# Patient Record
Sex: MALE | Race: BLACK OR AFRICAN AMERICAN | NOT HISPANIC OR LATINO | URBAN - METROPOLITAN AREA
[De-identification: names, ages, dates, MRNs, and addresses within clinical notes are randomized per-mention and may not be internally consistent; named-entity substitution may affect disease eponyms.]

---

## 2018-06-13 ENCOUNTER — OUTPATIENT (OUTPATIENT)
Dept: OUTPATIENT SERVICES | Facility: HOSPITAL | Age: 53
LOS: 1 days | Discharge: HOME | End: 2018-06-13

## 2018-06-13 DIAGNOSIS — E55.9 VITAMIN D DEFICIENCY, UNSPECIFIED: ICD-10-CM

## 2018-06-14 PROBLEM — Z00.00 ENCOUNTER FOR PREVENTIVE HEALTH EXAMINATION: Status: ACTIVE | Noted: 2018-06-14

## 2018-06-19 ENCOUNTER — OUTPATIENT (OUTPATIENT)
Dept: OUTPATIENT SERVICES | Facility: HOSPITAL | Age: 53
LOS: 1 days | Discharge: HOME | End: 2018-06-19

## 2018-06-19 DIAGNOSIS — N39.0 URINARY TRACT INFECTION, SITE NOT SPECIFIED: ICD-10-CM

## 2018-06-21 ENCOUNTER — APPOINTMENT (OUTPATIENT)
Dept: OTOLARYNGOLOGY | Facility: CLINIC | Age: 53
End: 2018-06-21
Payer: MEDICARE

## 2018-06-21 DIAGNOSIS — Z78.9 OTHER SPECIFIED HEALTH STATUS: ICD-10-CM

## 2018-06-21 DIAGNOSIS — Z93.1 GASTROSTOMY STATUS: ICD-10-CM

## 2018-06-21 DIAGNOSIS — J38.6 STENOSIS OF LARYNX: ICD-10-CM

## 2018-06-21 DIAGNOSIS — Z43.0 ENCOUNTER FOR ATTENTION TO TRACHEOSTOMY: ICD-10-CM

## 2018-06-21 DIAGNOSIS — E78.5 HYPERLIPIDEMIA, UNSPECIFIED: ICD-10-CM

## 2018-06-21 PROCEDURE — 31502 CHANGE OF WINDPIPE AIRWAY: CPT

## 2018-06-21 PROCEDURE — 99203 OFFICE O/P NEW LOW 30 MIN: CPT | Mod: 25

## 2018-06-21 PROCEDURE — 31615 TRCHEOBRNCHSC EST TRACHS INC: CPT

## 2018-06-21 PROCEDURE — 31575 DIAGNOSTIC LARYNGOSCOPY: CPT | Mod: 59

## 2018-06-21 RX ORDER — ELECTROLYTES/DEXTROSE
SOLUTION, ORAL ORAL
Refills: 0 | Status: ACTIVE | COMMUNITY

## 2018-06-21 RX ORDER — DOCUSATE SODIUM 50 MG/5 ML
LIQUID (ML) ORAL
Refills: 0 | Status: ACTIVE | COMMUNITY

## 2018-06-21 RX ORDER — ROSUVASTATIN CALCIUM 5 MG/1
TABLET, FILM COATED ORAL
Refills: 0 | Status: ACTIVE | COMMUNITY

## 2018-06-21 RX ORDER — ASPIRIN 195 MG
SUPPOSITORY, RECTAL RECTAL
Refills: 0 | Status: ACTIVE | COMMUNITY

## 2018-06-21 RX ORDER — CHOLECALCIFEROL (VITAMIN D3) 1MM UNIT/G
LIQUID (GRAM) MISCELLANEOUS
Refills: 0 | Status: ACTIVE | COMMUNITY

## 2018-06-21 RX ORDER — MIRTAZAPINE 30 MG/1
TABLET, FILM COATED ORAL
Refills: 0 | Status: ACTIVE | COMMUNITY

## 2018-06-21 RX ORDER — ENOXAPARIN SODIUM 300 MG/3ML
INJECTION INTRAVENOUS; SUBCUTANEOUS
Refills: 0 | Status: ACTIVE | COMMUNITY

## 2018-06-23 ENCOUNTER — INPATIENT (INPATIENT)
Facility: HOSPITAL | Age: 53
LOS: 16 days | Discharge: NURSING, REHAB OR LONG-TERM CARE | End: 2018-07-10
Attending: INTERNAL MEDICINE | Admitting: INTERNAL MEDICINE
Payer: MEDICARE

## 2018-06-23 VITALS — OXYGEN SATURATION: 96 % | HEART RATE: 102 BPM

## 2018-06-23 LAB
ALBUMIN SERPL ELPH-MCNC: 3.5 G/DL — SIGNIFICANT CHANGE UP (ref 3.5–5.2)
ALP SERPL-CCNC: 90 U/L — SIGNIFICANT CHANGE UP (ref 30–115)
ALT FLD-CCNC: 32 U/L — SIGNIFICANT CHANGE UP (ref 0–41)
ANION GAP SERPL CALC-SCNC: 10 MMOL/L — SIGNIFICANT CHANGE UP (ref 7–14)
APAP SERPL-MCNC: <5 UG/ML — LOW (ref 10–30)
APTT BLD: 36.5 SEC — SIGNIFICANT CHANGE UP (ref 27–39.2)
AST SERPL-CCNC: 20 U/L — SIGNIFICANT CHANGE UP (ref 0–41)
BASE EXCESS BLDV CALC-SCNC: 4.6 MMOL/L — HIGH (ref -2–2)
BASOPHILS # BLD AUTO: 0.02 K/UL — SIGNIFICANT CHANGE UP (ref 0–0.2)
BASOPHILS NFR BLD AUTO: 0.2 % — SIGNIFICANT CHANGE UP (ref 0–1)
BILIRUB DIRECT SERPL-MCNC: <0.2 MG/DL — SIGNIFICANT CHANGE UP (ref 0–0.2)
BILIRUB INDIRECT FLD-MCNC: SIGNIFICANT CHANGE UP MG/DL (ref 0.2–1.2)
BILIRUB SERPL-MCNC: <0.2 MG/DL — SIGNIFICANT CHANGE UP (ref 0.2–1.2)
BILIRUB SERPL-MCNC: <0.2 MG/DL — SIGNIFICANT CHANGE UP (ref 0.2–1.2)
BLD GP AB SCN SERPL QL: SIGNIFICANT CHANGE UP
BUN SERPL-MCNC: 9 MG/DL — LOW (ref 10–20)
CA-I SERPL-SCNC: 1.17 MMOL/L — SIGNIFICANT CHANGE UP (ref 1.12–1.3)
CALCIUM SERPL-MCNC: 8.7 MG/DL — SIGNIFICANT CHANGE UP (ref 8.5–10.1)
CHLORIDE SERPL-SCNC: 84 MMOL/L — LOW (ref 98–110)
CK SERPL-CCNC: 113 U/L — SIGNIFICANT CHANGE UP (ref 0–225)
CO2 SERPL-SCNC: 33 MMOL/L — HIGH (ref 17–32)
CREAT SERPL-MCNC: 0.5 MG/DL — LOW (ref 0.7–1.5)
EOSINOPHIL # BLD AUTO: 0 K/UL — SIGNIFICANT CHANGE UP (ref 0–0.7)
EOSINOPHIL NFR BLD AUTO: 0 % — SIGNIFICANT CHANGE UP (ref 0–8)
ETHANOL SERPL-MCNC: <10 MG/DL — HIGH
GAS PNL BLDA: SIGNIFICANT CHANGE UP
GAS PNL BLDV: 129 MMOL/L — LOW (ref 136–145)
GAS PNL BLDV: SIGNIFICANT CHANGE UP
GLUCOSE SERPL-MCNC: 112 MG/DL — HIGH (ref 70–99)
HCO3 BLDV-SCNC: 36 MMOL/L — HIGH (ref 22–29)
HCT VFR BLD CALC: 39.2 % — LOW (ref 42–52)
HCT VFR BLDA CALC: 40.3 % — SIGNIFICANT CHANGE UP (ref 34–44)
HGB BLD CALC-MCNC: 13.1 G/DL — LOW (ref 14–18)
HGB BLD-MCNC: 12.4 G/DL — LOW (ref 14–18)
IMM GRANULOCYTES NFR BLD AUTO: 0.6 % — HIGH (ref 0.1–0.3)
INR BLD: 1.24 RATIO — SIGNIFICANT CHANGE UP (ref 0.65–1.3)
LACTATE BLDV-MCNC: 3.4 MMOL/L — HIGH (ref 0.5–1.6)
LACTATE SERPL-SCNC: 3.6 MMOL/L — HIGH (ref 0.5–2.2)
LIDOCAIN IGE QN: 10 U/L — SIGNIFICANT CHANGE UP (ref 7–60)
LYMPHOCYTES # BLD AUTO: 1.21 K/UL — SIGNIFICANT CHANGE UP (ref 1.2–3.4)
LYMPHOCYTES # BLD AUTO: 9.7 % — LOW (ref 20.5–51.1)
MAGNESIUM SERPL-MCNC: 2 MG/DL — SIGNIFICANT CHANGE UP (ref 1.8–2.4)
MCHC RBC-ENTMCNC: 29 PG — SIGNIFICANT CHANGE UP (ref 27–31)
MCHC RBC-ENTMCNC: 31.6 G/DL — LOW (ref 32–37)
MCV RBC AUTO: 91.8 FL — SIGNIFICANT CHANGE UP (ref 80–94)
MONOCYTES # BLD AUTO: 0.92 K/UL — HIGH (ref 0.1–0.6)
MONOCYTES NFR BLD AUTO: 7.4 % — SIGNIFICANT CHANGE UP (ref 1.7–9.3)
NEUTROPHILS # BLD AUTO: 10.25 K/UL — HIGH (ref 1.4–6.5)
NEUTROPHILS NFR BLD AUTO: 82.1 % — HIGH (ref 42.2–75.2)
NRBC # BLD: 0 /100 WBCS — SIGNIFICANT CHANGE UP (ref 0–0)
PCO2 BLDV: 98 MMHG — HIGH (ref 41–51)
PH BLDV: 7.18 — LOW (ref 7.26–7.43)
PLATELET # BLD AUTO: 326 K/UL — SIGNIFICANT CHANGE UP (ref 130–400)
PO2 BLDV: 50 MMHG — HIGH (ref 20–40)
POTASSIUM BLDV-SCNC: 4.4 MMOL/L — SIGNIFICANT CHANGE UP (ref 3.3–5.6)
POTASSIUM SERPL-MCNC: 5 MMOL/L — SIGNIFICANT CHANGE UP (ref 3.5–5)
POTASSIUM SERPL-SCNC: 5 MMOL/L — SIGNIFICANT CHANGE UP (ref 3.5–5)
PROT SERPL-MCNC: 6.2 G/DL — SIGNIFICANT CHANGE UP (ref 6–8)
PROTHROM AB SERPL-ACNC: 13.4 SEC — HIGH (ref 9.95–12.87)
RBC # BLD: 4.27 M/UL — LOW (ref 4.7–6.1)
RBC # FLD: 11.7 % — SIGNIFICANT CHANGE UP (ref 11.5–14.5)
SALICYLATES SERPL-MCNC: <0.3 MG/DL — LOW (ref 4–30)
SAO2 % BLDV: 81 % — SIGNIFICANT CHANGE UP
SODIUM SERPL-SCNC: 127 MMOL/L — LOW (ref 135–146)
TROPONIN T SERPL-MCNC: 0.03 NG/ML — CRITICAL HIGH
TYPE + AB SCN PNL BLD: SIGNIFICANT CHANGE UP
WBC # BLD: 12.48 K/UL — HIGH (ref 4.8–10.8)
WBC # FLD AUTO: 12.48 K/UL — HIGH (ref 4.8–10.8)

## 2018-06-23 RX ORDER — SODIUM CHLORIDE 9 MG/ML
2000 INJECTION INTRAMUSCULAR; INTRAVENOUS; SUBCUTANEOUS ONCE
Qty: 0 | Refills: 0 | Status: COMPLETED | OUTPATIENT
Start: 2018-06-23 | End: 2018-06-23

## 2018-06-23 RX ORDER — VANCOMYCIN HCL 1 G
1000 VIAL (EA) INTRAVENOUS EVERY 12 HOURS
Qty: 0 | Refills: 0 | Status: DISCONTINUED | OUTPATIENT
Start: 2018-06-23 | End: 2018-07-02

## 2018-06-23 RX ORDER — NOREPINEPHRINE BITARTRATE/D5W 8 MG/250ML
0.05 PLASTIC BAG, INJECTION (ML) INTRAVENOUS
Qty: 8 | Refills: 0 | Status: DISCONTINUED | OUTPATIENT
Start: 2018-06-23 | End: 2018-06-23

## 2018-06-23 RX ORDER — ACETAMINOPHEN 500 MG
0 TABLET ORAL
Qty: 0 | Refills: 0 | COMMUNITY

## 2018-06-23 RX ORDER — MIRTAZAPINE 45 MG/1
7.5 TABLET, ORALLY DISINTEGRATING ORAL
Qty: 0 | Refills: 0 | COMMUNITY

## 2018-06-23 RX ORDER — DOCUSATE SODIUM 100 MG
0 CAPSULE ORAL
Qty: 0 | Refills: 0 | COMMUNITY

## 2018-06-23 RX ORDER — CEFEPIME 1 G/1
1000 INJECTION, POWDER, FOR SOLUTION INTRAMUSCULAR; INTRAVENOUS EVERY 8 HOURS
Qty: 0 | Refills: 0 | Status: DISCONTINUED | OUTPATIENT
Start: 2018-06-23 | End: 2018-06-24

## 2018-06-23 RX ORDER — CEFEPIME 1 G/1
INJECTION, POWDER, FOR SOLUTION INTRAMUSCULAR; INTRAVENOUS
Qty: 0 | Refills: 0 | Status: DISCONTINUED | OUTPATIENT
Start: 2018-06-23 | End: 2018-06-24

## 2018-06-23 RX ORDER — NOREPINEPHRINE BITARTRATE/D5W 8 MG/250ML
0.12 PLASTIC BAG, INJECTION (ML) INTRAVENOUS
Qty: 8 | Refills: 0 | Status: DISCONTINUED | OUTPATIENT
Start: 2018-06-23 | End: 2018-06-25

## 2018-06-23 RX ORDER — CEFEPIME 1 G/1
1000 INJECTION, POWDER, FOR SOLUTION INTRAMUSCULAR; INTRAVENOUS ONCE
Qty: 0 | Refills: 0 | Status: COMPLETED | OUTPATIENT
Start: 2018-06-23 | End: 2018-06-23

## 2018-06-23 RX ORDER — MIRTAZAPINE 45 MG/1
7.5 TABLET, ORALLY DISINTEGRATING ORAL AT BEDTIME
Qty: 0 | Refills: 0 | Status: DISCONTINUED | OUTPATIENT
Start: 2018-06-23 | End: 2018-07-10

## 2018-06-23 RX ORDER — LANOLIN ALCOHOL/MO/W.PET/CERES
0 CREAM (GRAM) TOPICAL
Qty: 0 | Refills: 0 | COMMUNITY

## 2018-06-23 RX ORDER — VANCOMYCIN HCL 1 G
1000 VIAL (EA) INTRAVENOUS ONCE
Qty: 0 | Refills: 0 | Status: COMPLETED | OUTPATIENT
Start: 2018-06-23 | End: 2018-06-23

## 2018-06-23 RX ORDER — HEPARIN SODIUM 5000 [USP'U]/ML
5000 INJECTION INTRAVENOUS; SUBCUTANEOUS EVERY 8 HOURS
Qty: 0 | Refills: 0 | Status: DISCONTINUED | OUTPATIENT
Start: 2018-06-23 | End: 2018-07-10

## 2018-06-23 RX ADMIN — Medication 25.78 MICROGRAM(S)/KG/MIN: at 19:15

## 2018-06-23 RX ADMIN — HEPARIN SODIUM 5000 UNIT(S): 5000 INJECTION INTRAVENOUS; SUBCUTANEOUS at 22:14

## 2018-06-23 RX ADMIN — Medication 250 MILLIGRAM(S): at 15:57

## 2018-06-23 RX ADMIN — MIRTAZAPINE 7.5 MILLIGRAM(S): 45 TABLET, ORALLY DISINTEGRATING ORAL at 22:28

## 2018-06-23 RX ADMIN — CEFEPIME 100 MILLIGRAM(S): 1 INJECTION, POWDER, FOR SOLUTION INTRAMUSCULAR; INTRAVENOUS at 15:57

## 2018-06-23 RX ADMIN — CEFEPIME 100 MILLIGRAM(S): 1 INJECTION, POWDER, FOR SOLUTION INTRAMUSCULAR; INTRAVENOUS at 22:34

## 2018-06-23 RX ADMIN — SODIUM CHLORIDE 8000 MILLILITER(S): 9 INJECTION INTRAMUSCULAR; INTRAVENOUS; SUBCUTANEOUS at 15:57

## 2018-06-23 NOTE — ED PROVIDER NOTE - PROGRESS NOTE DETAILS
Spoke to Eufemia Gusman, next of kin, aware of patient in the hospital and will come to the hospital Paging icu for approval Spoke to Dr. Meza, ICU fellow, who appoves patient to the vent unit under Dr. Traore Care endorsed to Dr. Mcnair

## 2018-06-23 NOTE — ED PROCEDURE NOTE - NS ED ATTENDING STATEMENT MOD
Attending Only
I have personally seen and examined this patient.  I have fully participated in the care of this patient. I have reviewed all pertinent clinical information, including history, physical exam, plan and the Resident’s note and agree except as noted.
I have personally seen and examined this patient.  I have fully participated in the care of this patient. I have reviewed all pertinent clinical information, including history, physical exam, plan and the Resident’s note and agree except as noted.

## 2018-06-23 NOTE — ED ADULT NURSE NOTE - OBJECTIVE STATEMENT
patient from [facility] BIBA found unresponsive and in respiratory distress by staff. Patient with previous trach and peg. hypotensive and tachycardic upon arrival. IO placed by EMS. trach changed by RT at bedside to cuffed

## 2018-06-23 NOTE — ED PROVIDER NOTE - ATTENDING CONTRIBUTION TO CARE
I am unable to obtain a comprehensive history, review of systems, past medical history, and/or physical exam due to constraints imposed by the urgency of the patient's clinical condition and/or mental status.     Pt is a 54yo male (unknown last time seen well) was found unresponsive and not breathing by NH staff.  EMS called and pt placed on trach mask as O2 sat was in 20s%.  Improved to 50s and EMS started bagging.  Pt awake on arrival but sluggish.    Exam: b/l breath sounds on ventilator, soft scaphoid abdomen, thin emaciated male, no LE edema, no rash, 1+ radial and DP pulses - hypotensive responding to IV push dose Epi  Imp: ?hypoxic injury  Plan: labs, XR chest, trach change, pressors, ICU consult (pt full code)

## 2018-06-23 NOTE — ED PROVIDER NOTE - OBJECTIVE STATEMENT
54 yo M with hx of acute spinal injury, trach, peg, neurogenic bladder, PTSD, BIBA from [place], for evaluation respiratory failure, onset 54 yo M with hx of acute spinal injury, trach, peg, neurogenic bladder, PTSD, BIBA from [place], for evaluation respiratory failure, onset today, patient's trach was aspirated and BVM was used, O2 sat was 88%. No fever, no chills, no headache, no nausea, no vomiting, no chest pain, no back pain. Per EMS, patient was started on BVM, and transfer. No other symptoms.

## 2018-06-23 NOTE — ED PROCEDURE NOTE - CPROC ED TIME OUT STATEMENT1
“Patient's name, , procedure and correct site were confirmed during the [place] Timeout.”
“Patient's name, , procedure and correct site were confirmed during the [place] Timeout.”
“Patient's name, , procedure and correct site were confirmed during the [place] Timeout.”

## 2018-06-23 NOTE — ED PROVIDER NOTE - CARE PLAN
Principal Discharge DX:	Septic shock  Secondary Diagnosis:	Acute respiratory failure with hypoxia and hypercapnia  Secondary Diagnosis:	Pneumonia of right lung due to infectious organism, unspecified part of lung

## 2018-06-23 NOTE — ED PROCEDURE NOTE - CPROC ED ARTER LINE DETAIL1
Using sterile technique, the correct location was identified, and a needle was inserted into the artery (specify in FT)./Connected to a pressurized flush line./Line was sutured in place./Hemostasis was achieved with direct pressure, and a dry sterile dressing applied./Positive blood return was obtained via the catheter.

## 2018-06-23 NOTE — ED PROVIDER NOTE - CRITICAL CARE PROVIDED
interpretation of diagnostic studies/consultation with other physicians/telephone consultation with the patient's family/direct patient care (not related to procedure)/documentation

## 2018-06-23 NOTE — ED PROVIDER NOTE - SECONDARY DIAGNOSIS.
Acute respiratory failure with hypoxia and hypercapnia Pneumonia of right lung due to infectious organism, unspecified part of lung

## 2018-06-23 NOTE — ED PROCEDURE NOTE - GENERAL INDICATIONS
uncuffed switch to cuff for mechanical ventilation due to low O2 sat uncuffed switch to cuffed Shiley for mechanical ventilation due to low O2 sat

## 2018-06-23 NOTE — ED ADULT NURSE NOTE - PMH
Deep vein thrombosis (DVT) of popliteal vein of right lower extremity, unspecified chronicity    Neurogenic bladder    PTSD (post-traumatic stress disorder)    Respiratory failure    Spinal cord injury, cervical region

## 2018-06-24 DIAGNOSIS — Z98.890 OTHER SPECIFIED POSTPROCEDURAL STATES: Chronic | ICD-10-CM

## 2018-06-24 DIAGNOSIS — Z93.1 GASTROSTOMY STATUS: Chronic | ICD-10-CM

## 2018-06-24 LAB
-  COAGULASE NEGATIVE STAPHYLOCOCCUS: SIGNIFICANT CHANGE UP
ANION GAP SERPL CALC-SCNC: 15 MMOL/L — HIGH (ref 7–14)
APPEARANCE UR: (no result)
BILIRUB UR-MCNC: NEGATIVE — SIGNIFICANT CHANGE UP
BUN SERPL-MCNC: 6 MG/DL — LOW (ref 10–20)
CALCIUM SERPL-MCNC: 8.7 MG/DL — SIGNIFICANT CHANGE UP (ref 8.5–10.1)
CHLORIDE SERPL-SCNC: 95 MMOL/L — LOW (ref 98–110)
CK MB CFR SERPL CALC: 2.7 NG/ML — SIGNIFICANT CHANGE UP (ref 0.6–6.3)
CK SERPL-CCNC: 251 U/L — HIGH (ref 0–225)
CO2 SERPL-SCNC: 25 MMOL/L — SIGNIFICANT CHANGE UP (ref 17–32)
COLOR SPEC: YELLOW — SIGNIFICANT CHANGE UP
CREAT SERPL-MCNC: <0.5 MG/DL — LOW (ref 0.7–1.5)
DIFF PNL FLD: (no result)
GLUCOSE SERPL-MCNC: 74 MG/DL — SIGNIFICANT CHANGE UP (ref 70–99)
GLUCOSE UR QL: NEGATIVE MG/DL — SIGNIFICANT CHANGE UP
GRAM STN FLD: SIGNIFICANT CHANGE UP
HCT VFR BLD CALC: 34.7 % — LOW (ref 42–52)
HGB BLD-MCNC: 11.8 G/DL — LOW (ref 14–18)
KETONES UR-MCNC: 15
LEUKOCYTE ESTERASE UR-ACNC: (no result)
MAGNESIUM SERPL-MCNC: 1.9 MG/DL — SIGNIFICANT CHANGE UP (ref 1.8–2.4)
MCHC RBC-ENTMCNC: 29.1 PG — SIGNIFICANT CHANGE UP (ref 27–31)
MCHC RBC-ENTMCNC: 34 G/DL — SIGNIFICANT CHANGE UP (ref 32–37)
MCV RBC AUTO: 85.5 FL — SIGNIFICANT CHANGE UP (ref 80–94)
METHOD TYPE: SIGNIFICANT CHANGE UP
MRSA PCR RESULT.: POSITIVE
NITRITE UR-MCNC: NEGATIVE — SIGNIFICANT CHANGE UP
NRBC # BLD: 0 /100 WBCS — SIGNIFICANT CHANGE UP (ref 0–0)
PH UR: 7.5 — SIGNIFICANT CHANGE UP (ref 5–8)
PLATELET # BLD AUTO: 281 K/UL — SIGNIFICANT CHANGE UP (ref 130–400)
POTASSIUM SERPL-MCNC: 4.4 MMOL/L — SIGNIFICANT CHANGE UP (ref 3.5–5)
POTASSIUM SERPL-SCNC: 4.4 MMOL/L — SIGNIFICANT CHANGE UP (ref 3.5–5)
PROT UR-MCNC: (no result) MG/DL
RBC # BLD: 4.06 M/UL — LOW (ref 4.7–6.1)
RBC # FLD: 11.7 % — SIGNIFICANT CHANGE UP (ref 11.5–14.5)
SODIUM SERPL-SCNC: 135 MMOL/L — SIGNIFICANT CHANGE UP (ref 135–146)
SP GR SPEC: <=1.005 — SIGNIFICANT CHANGE UP (ref 1.01–1.03)
SPECIMEN SOURCE: SIGNIFICANT CHANGE UP
TROPONIN T SERPL-MCNC: 0.08 NG/ML — CRITICAL HIGH
UROBILINOGEN FLD QL: 0.2 MG/DL — SIGNIFICANT CHANGE UP (ref 0.2–0.2)
WBC # BLD: 16.51 K/UL — HIGH (ref 4.8–10.8)
WBC # FLD AUTO: 16.51 K/UL — HIGH (ref 4.8–10.8)

## 2018-06-24 RX ORDER — DOCUSATE SODIUM 100 MG
100 CAPSULE ORAL THREE TIMES A DAY
Qty: 0 | Refills: 0 | Status: DISCONTINUED | OUTPATIENT
Start: 2018-06-24 | End: 2018-06-28

## 2018-06-24 RX ORDER — LANOLIN ALCOHOL/MO/W.PET/CERES
5 CREAM (GRAM) TOPICAL AT BEDTIME
Qty: 0 | Refills: 0 | Status: DISCONTINUED | OUTPATIENT
Start: 2018-06-24 | End: 2018-07-10

## 2018-06-24 RX ORDER — ATORVASTATIN CALCIUM 80 MG/1
80 TABLET, FILM COATED ORAL AT BEDTIME
Qty: 0 | Refills: 0 | Status: DISCONTINUED | OUTPATIENT
Start: 2018-06-24 | End: 2018-07-10

## 2018-06-24 RX ORDER — ACETAMINOPHEN 500 MG
650 TABLET ORAL EVERY 6 HOURS
Qty: 0 | Refills: 0 | Status: DISCONTINUED | OUTPATIENT
Start: 2018-06-24 | End: 2018-07-10

## 2018-06-24 RX ORDER — MEROPENEM 1 G/30ML
INJECTION INTRAVENOUS
Qty: 0 | Refills: 0 | Status: DISCONTINUED | OUTPATIENT
Start: 2018-06-24 | End: 2018-07-02

## 2018-06-24 RX ORDER — SODIUM CHLORIDE 9 MG/ML
1000 INJECTION INTRAMUSCULAR; INTRAVENOUS; SUBCUTANEOUS ONCE
Qty: 0 | Refills: 0 | Status: DISCONTINUED | OUTPATIENT
Start: 2018-06-24 | End: 2018-07-06

## 2018-06-24 RX ORDER — ASPIRIN/CALCIUM CARB/MAGNESIUM 324 MG
81 TABLET ORAL DAILY
Qty: 0 | Refills: 0 | Status: DISCONTINUED | OUTPATIENT
Start: 2018-06-24 | End: 2018-07-06

## 2018-06-24 RX ORDER — MEROPENEM 1 G/30ML
1000 INJECTION INTRAVENOUS ONCE
Qty: 0 | Refills: 0 | Status: COMPLETED | OUTPATIENT
Start: 2018-06-24 | End: 2018-06-24

## 2018-06-24 RX ORDER — MEROPENEM 1 G/30ML
1000 INJECTION INTRAVENOUS EVERY 8 HOURS
Qty: 0 | Refills: 0 | Status: DISCONTINUED | OUTPATIENT
Start: 2018-06-24 | End: 2018-07-02

## 2018-06-24 RX ORDER — PANTOPRAZOLE SODIUM 20 MG/1
40 TABLET, DELAYED RELEASE ORAL
Qty: 0 | Refills: 0 | Status: DISCONTINUED | OUTPATIENT
Start: 2018-06-24 | End: 2018-07-10

## 2018-06-24 RX ORDER — SENNA PLUS 8.6 MG/1
2 TABLET ORAL AT BEDTIME
Qty: 0 | Refills: 0 | Status: DISCONTINUED | OUTPATIENT
Start: 2018-06-24 | End: 2018-06-28

## 2018-06-24 RX ADMIN — Medication 650 MILLIGRAM(S): at 23:38

## 2018-06-24 RX ADMIN — Medication 250 MILLIGRAM(S): at 18:10

## 2018-06-24 RX ADMIN — MEROPENEM 100 MILLIGRAM(S): 1 INJECTION INTRAVENOUS at 23:48

## 2018-06-24 RX ADMIN — HEPARIN SODIUM 5000 UNIT(S): 5000 INJECTION INTRAVENOUS; SUBCUTANEOUS at 05:15

## 2018-06-24 RX ADMIN — MEROPENEM 100 MILLIGRAM(S): 1 INJECTION INTRAVENOUS at 11:59

## 2018-06-24 RX ADMIN — Medication 5 MILLIGRAM(S): at 23:33

## 2018-06-24 RX ADMIN — HEPARIN SODIUM 5000 UNIT(S): 5000 INJECTION INTRAVENOUS; SUBCUTANEOUS at 21:42

## 2018-06-24 RX ADMIN — CEFEPIME 100 MILLIGRAM(S): 1 INJECTION, POWDER, FOR SOLUTION INTRAMUSCULAR; INTRAVENOUS at 05:17

## 2018-06-24 RX ADMIN — Medication 100 MILLIGRAM(S): at 14:07

## 2018-06-24 RX ADMIN — SENNA PLUS 2 TABLET(S): 8.6 TABLET ORAL at 23:33

## 2018-06-24 RX ADMIN — HEPARIN SODIUM 5000 UNIT(S): 5000 INJECTION INTRAVENOUS; SUBCUTANEOUS at 14:07

## 2018-06-24 RX ADMIN — ATORVASTATIN CALCIUM 80 MILLIGRAM(S): 80 TABLET, FILM COATED ORAL at 23:33

## 2018-06-24 RX ADMIN — MIRTAZAPINE 7.5 MILLIGRAM(S): 45 TABLET, ORALLY DISINTEGRATING ORAL at 21:42

## 2018-06-24 RX ADMIN — Medication 650 MILLIGRAM(S): at 08:38

## 2018-06-24 RX ADMIN — Medication 100 MILLIGRAM(S): at 21:41

## 2018-06-24 RX ADMIN — Medication 81 MILLIGRAM(S): at 18:11

## 2018-06-24 RX ADMIN — Medication 250 MILLIGRAM(S): at 05:17

## 2018-06-24 RX ADMIN — PANTOPRAZOLE SODIUM 40 MILLIGRAM(S): 20 TABLET, DELAYED RELEASE ORAL at 12:00

## 2018-06-24 NOTE — H&P ADULT - ASSESSMENT
54 yo M presents with respiratory distress/hypoxemia, placed on mechanical ventilation and hypotension requiring pressor support.      Acute on Chronic Hypoxemic Respiratory Failure secondary to septic shock secondary to HCAP with concurrent mucous plug   -Continue Merrem and Vancomycin  -Check Vancomycin trough  -Check nasal MRSA swab, Deep tracheal aspirate/sputum culture  -Check blood cultures, Urinalaysis and Urine culture   -Continue mechanical ventilation  -BP stable this morning at 135/65, will titrate off Levophed    Troponemia   -r/o ACS  -f/u subsequent cardiac enzyme sets  -EKG negative changes    CODE STATUS: FULL CODE    Disposition: Return to NH when stable 54 yo M presents with respiratory distress/hypoxemia, placed on mechanical ventilation and hypotension requiring pressor support.      Acute on Chronic Hypoxemic Respiratory Failure secondary to septic shock secondary to HCAP with concurrent mucous plug   -Continue Merrem and Vancomycin  -Check Vancomycin trough  -Check nasal MRSA swab, Deep tracheal aspirate/sputum culture  -Check blood cultures, Urinalaysis and Urine culture   -Continue mechanical ventilation  -BP stable this morning at 135/65, will titrate off Levophed    Troponemia   -r/o ACS  -f/u subsequent cardiac enzyme sets  -EKG negative changes    CODE STATUS: FULL CODE    Heparin SQ and protonix prophylaxis   Disposition: Return to NH when stable 54 yo M presents with respiratory distress/hypoxemia, placed on mechanical ventilation and hypotension requiring pressor support.      Acute on Chronic Hypoxemic Respiratory Failure secondary to septic shock secondary to HCAP with concurrent mucous plug   -Continue Merrem and Vancomycin, awaiting Infectious disease evaluation  -Check Vancomycin trough  -Check nasal MRSA swab, Deep tracheal aspirate/sputum culture  -Check blood cultures, Urinalaysis and Urine culture   -Continue mechanical ventilation  -BP stable this morning at 135/65, will titrate off Levophed    Troponemia   -r/o ACS  -f/u subsequent cardiac enzyme sets  -EKG negative changes    CODE STATUS: FULL CODE    Heparin SQ and protonix prophylaxis   Disposition: Return to NH when stable 54 yo M presents with respiratory distress/hypoxemia, placed on mechanical ventilation and hypotension requiring pressor support.      Acute on Chronic Hypoxemic Respiratory Failure secondary to septic shock secondary to HCAP with concurrent mucous plug   -Continue Merrem and Vancomycin, awaiting Infectious disease evaluation  -Check Vancomycin trough  -Check nasal MRSA swab, Deep tracheal aspirate/sputum culture  -Check blood cultures, Urinalaysis and Urine culture   -Continue mechanical ventilation  -BP stable this morning at 135/65, will titrate off Levophed  -Will check venous duplex    Troponemia   -r/o ACS  -f/u subsequent cardiac enzyme sets  -EKG negative changes    CODE STATUS: FULL CODE    Heparin SQ and protonix prophylaxis   Disposition: Return to NH when stable 54 yo M presents with respiratory distress/hypoxemia, placed on mechanical ventilation and hypotension requiring pressor support.      Acute Hypoxemic Respiratory Failure secondary to septic shock secondary to HCAP with concurrent mucous plug(?)   -Continue Merrem and Vancomycin, awaiting Infectious disease evaluation  -Check Vancomycin trough  -Check nasal MRSA swab, Deep tracheal aspirate/sputum culture  -Check blood cultures, Urinalaysis and Urine culture   -Continue mechanical ventilation  -BP stable this morning at 135/65, will titrate off Levophed  -Will check venous duplex    Troponemia   -r/o ACS  -f/u subsequent cardiac enzyme sets  -EKG negative changes    CODE STATUS: FULL CODE    Heparin SQ and protonix prophylaxis   Disposition: Return to NH when stable

## 2018-06-24 NOTE — H&P ADULT - NSHPREVIEWOFSYSTEMS_GEN_ALL_CORE
REVIEW OF SYSTEMS:  CONSTITUTIONAL: No fever, weight loss, or fatigue  EYES: No eye pain, visual disturbances, or discharge  ENMT:  No difficulty hearing, tinnitus, vertigo; No sinus or throat pain  NECK: tracheostomy intact   BREASTS: No pain, masses, or nipple discharge  RESPIRATORY: No cough, wheezing, chills or hemoptysis; positive shortness of breath  CARDIOVASCULAR: No chest pain, palpitations, dizziness, or leg swelling  GASTROINTESTINAL: No abdominal or epigastric pain. No nausea, vomiting, or hematemesis; No diarrhea or constipation. No melena or hematochezia.  GENITOURINARY: No dysuria, frequency, hematuria, or incontinence  NEUROLOGICAL: No headaches, memory loss, loss of strength, numbness, or tremors  SKIN: No itching, burning, rashes, or lesions   LYMPH NODES: No enlarged glands  ENDOCRINE: No heat or cold intolerance; No hair loss  MUSCULOSKELETAL: No joint pain or swelling; No muscle, back, or extremity pain  PSYCHIATRIC: No depression, anxiety, mood swings, or difficulty sleeping  HEME/LYMPH: No easy bruising, or bleeding gums  ALLERGY AND IMMUNOLOGIC: No hives or eczema

## 2018-06-24 NOTE — H&P ADULT - NSHPLABSRESULTS_GEN_ALL_CORE
12.4   12.48 )-----------( 326      ( 23 Jun 2018 15:17 )             39.2       06-23    127<L>  |  84<L>  |  9<L>  ----------------------------<  112<H>  5.0   |  33<H>  |  0.5<L>    Ca    8.7      23 Jun 2018 15:17  Mg     2.0     06-23    TPro  6.2  /  Alb  3.5  /  TBili  <0.2  /  DBili  <0.2  /  AST  20  /  ALT  32  /  AlkPhos  90  06-23          ABG - ( 23 Jun 2018 17:44 )  pH, Arterial: 7.44  pH, Blood: x     /  pCO2: 44    /  pO2: 64    / HCO3: 30    / Base Excess: 5.1   /  SaO2: 95                      PT/INR - ( 23 Jun 2018 15:17 )   PT: 13.40 sec;   INR: 1.24 ratio         PTT - ( 23 Jun 2018 15:17 )  PTT:36.5 sec    Lactate Trend  06-23 @ 15:17 Lactate:3.6       CARDIAC MARKERS ( 23 Jun 2018 15:17 )  x     / 0.03 ng/mL / 113 U/L / x     / x

## 2018-06-24 NOTE — H&P ADULT - HISTORY OF PRESENT ILLNESS
54 yo M with PMHx of acute spinal injury with paraplegia s/p trach and peg (7/27/2017), recurrent pneumonia, ESBL E.Coli Bacteremia,  Klebsiella UTI, neurogenic bladder, PTSD, right popliteal DVT presented from [facility] for episode of desaturation and respiratory failure occurring on morning of presentation. Despite aspiration from trach, patient remained hypoxic, in the ED patient had his cuffless trach substituted for size 6 cuffed shiley. Patient was also found to be hypotensive to 58/36, CXR revealed near opacification of the right lung. Patient was placed on ventilator and started on Levophed.  As per patient, he endorses shortness of breath, denies chest pain, palpitations, fevers, chills, nausea, vomiting. 54 yo M with PMHx of acute spinal injury with quadraplegia s/p trach and peg (7/27/2017), recurrent pneumonia, ESBL E.Coli Bacteremia,  Klebsiella UTI, neurogenic bladder, PTSD, right popliteal DVT presented from [facility] for episode of desaturation and respiratory failure occurring on morning of presentation. Despite aspiration from trach, patient remained hypoxic, in the ED patient had his cuffless trach substituted for size 6 cuffed shiley. Patient was also found to be hypotensive to 58/36, CXR revealed near opacification of the right lung. Patient was placed on ventilator and started on Levophed.  As per patient, he endorses shortness of breath, denies chest pain, palpitations, fevers, chills, nausea, vomiting.

## 2018-06-25 LAB
ANION GAP SERPL CALC-SCNC: 10 MMOL/L — SIGNIFICANT CHANGE UP (ref 7–14)
BASE EXCESS BLDA CALC-SCNC: 6 MMOL/L — HIGH (ref -2–2)
BUN SERPL-MCNC: 8 MG/DL — LOW (ref 10–20)
CALCIUM SERPL-MCNC: 8 MG/DL — LOW (ref 8.5–10.1)
CHLORIDE SERPL-SCNC: 97 MMOL/L — LOW (ref 98–110)
CK MB CFR SERPL CALC: 1 NG/ML — SIGNIFICANT CHANGE UP (ref 0.6–6.3)
CK SERPL-CCNC: 296 U/L — HIGH (ref 0–225)
CO2 SERPL-SCNC: 28 MMOL/L — SIGNIFICANT CHANGE UP (ref 17–32)
CREAT SERPL-MCNC: <0.5 MG/DL — LOW (ref 0.7–1.5)
CULTURE RESULTS: SIGNIFICANT CHANGE UP
GLUCOSE SERPL-MCNC: 247 MG/DL — HIGH (ref 70–99)
GRAM STN FLD: SIGNIFICANT CHANGE UP
GRAM STN FLD: SIGNIFICANT CHANGE UP
HCO3 BLDA-SCNC: 30 MMOL/L — HIGH (ref 23–27)
HCT VFR BLD CALC: 31.6 % — LOW (ref 42–52)
HGB BLD-MCNC: 10.5 G/DL — LOW (ref 14–18)
MAGNESIUM SERPL-MCNC: 1.9 MG/DL — SIGNIFICANT CHANGE UP (ref 1.8–2.4)
MCHC RBC-ENTMCNC: 28.8 PG — SIGNIFICANT CHANGE UP (ref 27–31)
MCHC RBC-ENTMCNC: 33.2 G/DL — SIGNIFICANT CHANGE UP (ref 32–37)
MCV RBC AUTO: 86.8 FL — SIGNIFICANT CHANGE UP (ref 80–94)
NRBC # BLD: 0 /100 WBCS — SIGNIFICANT CHANGE UP (ref 0–0)
ORGANISM # SPEC MICROSCOPIC CNT: SIGNIFICANT CHANGE UP
ORGANISM # SPEC MICROSCOPIC CNT: SIGNIFICANT CHANGE UP
PCO2 BLDA: 37 MMHG — LOW (ref 38–42)
PH BLDA: 7.51 — HIGH (ref 7.38–7.42)
PLATELET # BLD AUTO: 319 K/UL — SIGNIFICANT CHANGE UP (ref 130–400)
PO2 BLDA: 125 MMHG — HIGH (ref 78–95)
POTASSIUM SERPL-MCNC: 4 MMOL/L — SIGNIFICANT CHANGE UP (ref 3.5–5)
POTASSIUM SERPL-SCNC: 4 MMOL/L — SIGNIFICANT CHANGE UP (ref 3.5–5)
RBC # BLD: 3.64 M/UL — LOW (ref 4.7–6.1)
RBC # FLD: 12.2 % — SIGNIFICANT CHANGE UP (ref 11.5–14.5)
SAO2 % BLDA: 99 % — HIGH (ref 94–98)
SODIUM SERPL-SCNC: 135 MMOL/L — SIGNIFICANT CHANGE UP (ref 135–146)
SPECIMEN SOURCE: SIGNIFICANT CHANGE UP
SPECIMEN SOURCE: SIGNIFICANT CHANGE UP
TROPONIN T SERPL-MCNC: 0.07 NG/ML — CRITICAL HIGH
WBC # BLD: 11.47 K/UL — HIGH (ref 4.8–10.8)
WBC # FLD AUTO: 11.47 K/UL — HIGH (ref 4.8–10.8)

## 2018-06-25 PROCEDURE — 93970 EXTREMITY STUDY: CPT | Mod: 26

## 2018-06-25 RX ADMIN — MIRTAZAPINE 7.5 MILLIGRAM(S): 45 TABLET, ORALLY DISINTEGRATING ORAL at 21:45

## 2018-06-25 RX ADMIN — Medication 100 MILLIGRAM(S): at 14:22

## 2018-06-25 RX ADMIN — HEPARIN SODIUM 5000 UNIT(S): 5000 INJECTION INTRAVENOUS; SUBCUTANEOUS at 05:10

## 2018-06-25 RX ADMIN — MEROPENEM 100 MILLIGRAM(S): 1 INJECTION INTRAVENOUS at 05:10

## 2018-06-25 RX ADMIN — Medication 81 MILLIGRAM(S): at 12:52

## 2018-06-25 RX ADMIN — Medication 100 MILLIGRAM(S): at 05:11

## 2018-06-25 RX ADMIN — Medication 250 MILLIGRAM(S): at 05:09

## 2018-06-25 RX ADMIN — HEPARIN SODIUM 5000 UNIT(S): 5000 INJECTION INTRAVENOUS; SUBCUTANEOUS at 21:44

## 2018-06-25 RX ADMIN — PANTOPRAZOLE SODIUM 40 MILLIGRAM(S): 20 TABLET, DELAYED RELEASE ORAL at 07:20

## 2018-06-25 RX ADMIN — HEPARIN SODIUM 5000 UNIT(S): 5000 INJECTION INTRAVENOUS; SUBCUTANEOUS at 14:22

## 2018-06-25 RX ADMIN — Medication 250 MILLIGRAM(S): at 17:37

## 2018-06-25 RX ADMIN — SENNA PLUS 2 TABLET(S): 8.6 TABLET ORAL at 21:44

## 2018-06-25 RX ADMIN — Medication 5 MILLIGRAM(S): at 21:44

## 2018-06-25 RX ADMIN — MEROPENEM 100 MILLIGRAM(S): 1 INJECTION INTRAVENOUS at 21:45

## 2018-06-25 RX ADMIN — MEROPENEM 100 MILLIGRAM(S): 1 INJECTION INTRAVENOUS at 14:26

## 2018-06-25 RX ADMIN — Medication 100 MILLIGRAM(S): at 21:44

## 2018-06-25 RX ADMIN — ATORVASTATIN CALCIUM 80 MILLIGRAM(S): 80 TABLET, FILM COATED ORAL at 21:44

## 2018-06-25 NOTE — DIETITIAN INITIAL EVALUATION ADULT. - ORAL INTAKE PTA
Pt reports consuming pureed foods with Ensure 4x/day and tolerating well. Pt also is allergic to shellfish, doesn't eat pork or beef. Unable to obtain any info regarding tube feeds at this time./good good/PTA pt reports consuming pureed foods with Ensure 4x/day and tolerating well. Pt also is allergic to shellfish, doesn't eat pork or beef. Unable to obtain any info regarding tube feeds at this time.

## 2018-06-25 NOTE — DIETITIAN INITIAL EVALUATION ADULT. - FEEDING SKILL
total assistance/Pt is receiving solely TF, which per RN states pt is tolerating well with no issues. PEG in place

## 2018-06-25 NOTE — DIETITIAN INITIAL EVALUATION ADULT. - ENERGY NEEDS
Estimated Calorie Needs: 6509-7078 kcal/day (MSJ x 1.2-1.3 AF)   Estimated Protein Needs: 76-91 gm/day (1-1.2 gmkg CBW)   Estimated Fluid Needs: per vent team

## 2018-06-25 NOTE — DIETITIAN INITIAL EVALUATION ADULT. - NS AS NUTRI INTERV ENTERAL NUTRITION
Composition/Switch to the following TF regimen: Osmolite 1.5 240ml q 4hrs to provide 2160kcal, 90gm pro, 1097ml free water. Additional flushes per LIP.

## 2018-06-25 NOTE — PROGRESS NOTE ADULT - SUBJECTIVE AND OBJECTIVE BOX
Patient is a 53y old  Male who presents with a chief complaint of desaturation to 88% and respiratory distress (24 Jun 2018 08:12)  SEPTIC SHOCK ACUTE RESPIRATORY FAILURE WITH HYPOXIA AND HYPERCAPNIA PNEUMO  ^SEPTIC SHOCK ACUTE RESPIRATORY FAILURE WITH HYPOXIA AND HYPERCAPNIA PNEUMO  No pertinent family history in first degree relatives  MEWS Score  Deep vein thrombosis (DVT) of popliteal vein of right lower extremity, unspecified chronicity  PTSD (post-traumatic stress disorder)  Neurogenic bladder  Respiratory failure  Spinal cord injury, cervical region  Septic shock  H/O tracheostomy  PEG (percutaneous endoscopic gastrostomy) status  UNRESP  Pneumonia of right lung due to infectious organism, unspecified part of lung  Acute respiratory failure with hypoxia and hypercapnia    SEPTIC SHOCK ACUTE RESPIRATORY FAILURE WITH HYPOXIA AND HYPERCAPNIA PNEUMO  ^SEPTIC SHOCK ACUTE RESPIRATORY FAILURE WITH HYPOXIA AND HYPERCAPNIA PNEUMO  No pertinent family history in first degree relatives  MEWS Score  Deep vein thrombosis (DVT) of popliteal vein of right lower extremity, unspecified chronicity  PTSD (post-traumatic stress disorder)  Neurogenic bladder  Respiratory failure  Spinal cord injury, cervical region  Septic shock  H/O tracheostomy  PEG (percutaneous endoscopic gastrostomy) status  UNRESP  Pneumonia of right lung due to infectious organism, unspecified part of lung  Acute respiratory failure with hypoxia and hypercapnia  SEPTIC SHOCK ACUTE RESPIRATORY FAILURE WITH HYPOXIA AND HYPERCAPNIA PNEUMO [Active]  Deep vein thrombosis (DVT) of popliteal vein of right lower extremity, unspecified chronicity [Active]  PTSD (post-traumatic stress disorder) [Active]  Neurogenic bladder [Active]  Respiratory failure [Active]  Spinal cord injury, cervical region [Active]  Septic shock [Active]  H/O tracheostomy [Active]  PEG (percutaneous endoscopic gastrostomy) status [Active]  Pneumonia of right lung due to infectious organism, unspecified part of lung [Active]  Acute respiratory failure with hypoxia and hypercapnia [Active]          HOSPITAL DAY NO: 2d      Vital Signs Last 24 Hrs  T(C): 37 (25 Jun 2018 07:30), Max: 39.8 (25 Jun 2018 00:27)  T(F): 98.6 (25 Jun 2018 07:30), Max: 103.7 (25 Jun 2018 00:27)  HR: 93 (25 Jun 2018 07:29) (93 - 112)  BP: 119/63 (25 Jun 2018 07:30) (98/55 - 119/63)  BP(mean): 119 (25 Jun 2018 07:30) (75 - 119)  RR: 24 (25 Jun 2018 07:30) (20 - 31)  SpO2: 98% (25 Jun 2018 07:29) (98% - 100%)    use of pressors: Y ___  N _x___    use of sedation: Y ___  N x____    Vent dependent: Y____  N ____    Mode: AC/ CMV (Assist Control/ Continuous Mandatory Ventilation)  RR (machine): 450  TV (machine): 20  FiO2: 40  PEEP: 5  ITime: 1  MAP: 11  PIP: 18           Mode: AC/ CMV (Assist Control/ Continuous Mandatory Ventilation)  RR (machine): 450  TV (machine): 20  FiO2: 40  PEEP: 5  ITime: 1  MAP: 11  PIP: 18                             ABG - ( 25 Jun 2018 00:55 )  pH, Arterial: 7.51  pH, Blood: x     /  pCO2: 37    /  pO2: 125   / HCO3: 30    / Base Excess: 6.0   /  SaO2: 99                  Weaning time:x     Significant exam findings:   MS: responds  CHEST: B/L breath sounds   ABDOMEN: soft   HEART:S1/S2 regular  SKIN: 1 stage buttocks    No of BMs:o       Nutrition: peg                   06-24-18 @ 07:01  -  06-25-18 @ 07:00  --------------------------------------------------------  IN:    Osmolite: 720 mL  Total IN: 720 mL          LABS:                          10.5   11.47 )-----------( 319      ( 25 Jun 2018 06:43 )             31.6                                               06-24    135  |  95<L>  |  6<L>  ----------------------------<  74  4.4   |  25  |  <0.5<L>    Ca    8.7      24 Jun 2018 12:40  Mg     1.9     06-24    TPro  6.2  /  Alb  3.5  /  TBili  <0.2  /  DBili  <0.2  /  AST  20  /  ALT  32  /  AlkPhos  90  06-23      PT/INR - ( 23 Jun 2018 15:17 )   PT: 13.40 sec;   INR: 1.24 ratio         PTT - ( 23 Jun 2018 15:17 )  PTT:36.5 sec                                          LIVER FUNCTIONS - ( 23 Jun 2018 15:17 )  Alb: 3.5 g/dL / Pro: 6.2 g/dL / ALK PHOS: 90 U/L / ALT: 32 U/L / AST: 20 U/L / GGT: x                                                  Culture - Blood (collected 23 Jun 2018 15:15)  Source: .Blood Blood  Gram Stain (25 Jun 2018 04:17):    Growth in aerobic bottle: Gram Positive Cocci in Clusters    Growth in anaerobic bottle:    Gram Positive Cocci in Clusters  Preliminary Report (25 Jun 2018 04:17):    Growth in aerobic bottle: Gram Positive Cocci in Clusters    ***Blood Panel PCR results on this specimen are available    approximately 3 hours after the Gram stain result.***    Gram stain, PCR, and/or culture results may not always    correspond due to difference in methodologies.    ************************************************************    This PCR assay was performed using [employer].    The following targets are tested for: Enterococcus,    vancomycin resistant enterococci, Listeria monocytogenes,    coagulase negative staphylococci, S. aureus,    methicillin resistant S. aureus, Streptococcus agalactiae    (Group B), S. pneumoniae, S. pyogenes (Group A),    Acinetobacter baumannii, Enterobacter cloacae, E. coli,    Klebsiella oxytoca, K. pneumoniae, Proteus sp.,    Serratia marcescens, Haemophilus influenzae,    Neisseria meningitidis, Pseudomonas aeruginosa, Candida    albicans, C. glabrata, C krusei, C parapsilosis,    C. tropicalis and the KPC resistance gene.    "Due to technical problems, Proteus sp. will Not be reported as part of    the BCID panel until further notice"    Growth in anaerobic bottle:    Gram Positive Cocci in Clusters  Organism: Blood Culture PCR (24 Jun 2018 20:07)  Organism: Blood Culture PCR (24 Jun 2018 20:07)                                                       MEDICATIONS  (STANDING):  aspirin enteric coated 81 milliGRAM(s) Oral daily  atorvastatin 80 milliGRAM(s) Oral at bedtime  docusate sodium 100 milliGRAM(s) Oral three times a day  heparin  Injectable 5000 Unit(s) SubCutaneous every 8 hours  melatonin 5 milliGRAM(s) Oral at bedtime  meropenem  IVPB      meropenem  IVPB 1000 milliGRAM(s) IV Intermittent every 8 hours  mirtazapine 7.5 milliGRAM(s) Oral at bedtime  norepinephrine Infusion 0.125 MICROgram(s)/kG/Min (12.891 mL/Hr) IV Continuous <Continuous>  pantoprazole   Suspension 40 milliGRAM(s) Oral before breakfast  senna 2 Tablet(s) Oral at bedtime  sodium chloride 0.9% Bolus 1000 milliLiter(s) IV Bolus once  vancomycin  IVPB 1000 milliGRAM(s) IV Intermittent every 12 hours    MEDICATIONS  (PRN):  acetaminophen   Tablet 650 milliGRAM(s) Oral every 6 hours PRN For Temp greater than 38 C (100.4 F)      06-24-18 @ 07:01  -  06-25-18 @ 07:00  --------------------------------------------------------  IN: 0.2 mL/kg/hr / OUT: 0 mL/kg/hr / NET: 0.2 mL/kg/hr        Case discussed with staff and family at the bedside

## 2018-06-25 NOTE — PROGRESS NOTE ADULT - ASSESSMENT
AHRF/ R LUNG OPACITY/ AIRSPACE DISEASE/ ? ASPIRATION/ QUADREPLEGIA S/P PEG / TRACHE    - REPEAT CXR  - IV ABX  - DTA  - TAPER PRESSORS IF NEEDED IVF  - REPEAT LE DOPPLER  - POOR PROGNOSIS  - DVT / GI PROPHYLAXIS

## 2018-06-25 NOTE — DIETITIAN INITIAL EVALUATION ADULT. - PHYSICAL APPEARANCE
sleepy upon visit; trached/on vent. BMI: 20.9 (stated ht 75"/168#), IBW: 196#, UBW: 150#--?accuracy as pt reports some recent wt loss, but unable to make out what pt was saying. Will clarify upon f/u. Abel 14/well nourished

## 2018-06-25 NOTE — DIETITIAN INITIAL EVALUATION ADULT. - SOURCE
other (specify)/woke pt up for assessment, provided limited conversation as it was hard for him to communicate at the time. RN/patient patient/other (specify)/woke pt up for assessment, engaged in limited conversation as it was hard for him to communicate at the time. RN

## 2018-06-25 NOTE — DIETITIAN INITIAL EVALUATION ADULT. - OTHER INFO
Pt p/w  respiratory distress/hypoxemia, placed on mechanical ventilation and hypotension requiring pressor support. CXR improved, awaiting cultures. Reason for Assessment: Inappropriate Consult for Pressure Ulcer, as verified with RN that pt doesn't have any pressure ulcers.

## 2018-06-26 LAB
-  AMIKACIN: SIGNIFICANT CHANGE UP
-  AMOXICILLIN/CLAVULANIC ACID: SIGNIFICANT CHANGE UP
-  AMPICILLIN/SULBACTAM: SIGNIFICANT CHANGE UP
-  AMPICILLIN: SIGNIFICANT CHANGE UP
-  AMPICILLIN: SIGNIFICANT CHANGE UP
-  AZTREONAM: SIGNIFICANT CHANGE UP
-  CEFAZOLIN: SIGNIFICANT CHANGE UP
-  CEFEPIME: SIGNIFICANT CHANGE UP
-  CEFOXITIN: SIGNIFICANT CHANGE UP
-  CEFTRIAXONE: SIGNIFICANT CHANGE UP
-  CIPROFLOXACIN: SIGNIFICANT CHANGE UP
-  CIPROFLOXACIN: SIGNIFICANT CHANGE UP
-  DAPTOMYCIN: SIGNIFICANT CHANGE UP
-  ERTAPENEM: SIGNIFICANT CHANGE UP
-  GENTAMICIN: SIGNIFICANT CHANGE UP
-  IMIPENEM: SIGNIFICANT CHANGE UP
-  LEVOFLOXACIN: SIGNIFICANT CHANGE UP
-  LEVOFLOXACIN: SIGNIFICANT CHANGE UP
-  LINEZOLID: SIGNIFICANT CHANGE UP
-  MEROPENEM: SIGNIFICANT CHANGE UP
-  NITROFURANTOIN: SIGNIFICANT CHANGE UP
-  NITROFURANTOIN: SIGNIFICANT CHANGE UP
-  PIPERACILLIN/TAZOBACTAM: SIGNIFICANT CHANGE UP
-  TETRACYCLINE: SIGNIFICANT CHANGE UP
-  TIGECYCLINE: SIGNIFICANT CHANGE UP
-  TOBRAMYCIN: SIGNIFICANT CHANGE UP
-  TRIMETHOPRIM/SULFAMETHOXAZOLE: SIGNIFICANT CHANGE UP
-  VANCOMYCIN: SIGNIFICANT CHANGE UP
CULTURE RESULTS: SIGNIFICANT CHANGE UP
METHOD TYPE: SIGNIFICANT CHANGE UP
METHOD TYPE: SIGNIFICANT CHANGE UP
ORGANISM # SPEC MICROSCOPIC CNT: SIGNIFICANT CHANGE UP
SPECIMEN SOURCE: SIGNIFICANT CHANGE UP
VANCOMYCIN TROUGH SERPL-MCNC: 12.5 UG/ML — HIGH (ref 5–10)

## 2018-06-26 RX ADMIN — MIRTAZAPINE 7.5 MILLIGRAM(S): 45 TABLET, ORALLY DISINTEGRATING ORAL at 21:43

## 2018-06-26 RX ADMIN — Medication 250 MILLIGRAM(S): at 06:33

## 2018-06-26 RX ADMIN — Medication 81 MILLIGRAM(S): at 11:01

## 2018-06-26 RX ADMIN — Medication 100 MILLIGRAM(S): at 21:44

## 2018-06-26 RX ADMIN — MEROPENEM 100 MILLIGRAM(S): 1 INJECTION INTRAVENOUS at 13:07

## 2018-06-26 RX ADMIN — HEPARIN SODIUM 5000 UNIT(S): 5000 INJECTION INTRAVENOUS; SUBCUTANEOUS at 13:07

## 2018-06-26 RX ADMIN — PANTOPRAZOLE SODIUM 40 MILLIGRAM(S): 20 TABLET, DELAYED RELEASE ORAL at 06:33

## 2018-06-26 RX ADMIN — HEPARIN SODIUM 5000 UNIT(S): 5000 INJECTION INTRAVENOUS; SUBCUTANEOUS at 06:33

## 2018-06-26 RX ADMIN — HEPARIN SODIUM 5000 UNIT(S): 5000 INJECTION INTRAVENOUS; SUBCUTANEOUS at 21:44

## 2018-06-26 RX ADMIN — MEROPENEM 100 MILLIGRAM(S): 1 INJECTION INTRAVENOUS at 06:33

## 2018-06-26 RX ADMIN — ATORVASTATIN CALCIUM 80 MILLIGRAM(S): 80 TABLET, FILM COATED ORAL at 21:44

## 2018-06-26 RX ADMIN — Medication 100 MILLIGRAM(S): at 13:06

## 2018-06-26 RX ADMIN — Medication 250 MILLIGRAM(S): at 17:46

## 2018-06-26 RX ADMIN — SENNA PLUS 2 TABLET(S): 8.6 TABLET ORAL at 21:43

## 2018-06-26 RX ADMIN — MEROPENEM 100 MILLIGRAM(S): 1 INJECTION INTRAVENOUS at 21:43

## 2018-06-26 RX ADMIN — Medication 5 MILLIGRAM(S): at 22:37

## 2018-06-26 RX ADMIN — Medication 100 MILLIGRAM(S): at 06:33

## 2018-06-26 NOTE — PROGRESS NOTE ADULT - SUBJECTIVE AND OBJECTIVE BOX
Patient is a 53y old  Male who presents with a chief complaint of desaturation to 88% and respiratory distress (24 Jun 2018 08:12)    SEPTIC SHOCK ACUTE RESPIRATORY FAILURE WITH HYPOXIA AND HYPERCAPNIA PNEUMO  ^UNRESP  No pertinent family history in first degree relatives  MEWS Score  Deep vein thrombosis (DVT) of popliteal vein of right lower extremity, unspecified chronicity  PTSD (post-traumatic stress disorder)  Neurogenic bladder  Respiratory failure  Spinal cord injury, cervical region  Septic shock  H/O tracheostomy  PEG (percutaneous endoscopic gastrostomy) status  UNRESP  Pneumonia of right lung due to infectious organism, unspecified part of lung  Acute respiratory failure with hypoxia and hypercapnia  SEPTIC SHOCK ACUTE RESPIRATORY FAILURE WITH HYPOXIA AND HYPERCAPNIA PNEUMO [Active]  Deep vein thrombosis (DVT) of popliteal vein of right lower extremity, unspecified chronicity [Active]  PTSD (post-traumatic stress disorder) [Active]  Neurogenic bladder [Active]  Respiratory failure [Active]  Spinal cord injury, cervical region [Active]  Septic shock [Active]  H/O tracheostomy [Active]  PEG (percutaneous endoscopic gastrostomy) status [Active]  Pneumonia of right lung due to infectious organism, unspecified part of lung [Active]  Acute respiratory failure with hypoxia and hypercapnia [Active]          HOSPITAL DAY NO:       Vital Signs Last 24 Hrs  T(C): 38.1 (26 Jun 2018 07:50), Max: 38.1 (26 Jun 2018 07:50)  T(F): 100.5 (26 Jun 2018 07:50), Max: 100.5 (26 Jun 2018 07:50)  HR: 109 (26 Jun 2018 07:51) (81 - 109)  BP: 105/66 (26 Jun 2018 07:50) (90/54 - 105/66)  BP(mean): 78 (26 Jun 2018 07:50) (66 - 78)  RR: 20 (26 Jun 2018 07:50) (20 - 26)  SpO2: 98% (26 Jun 2018 07:51) (97% - 99%)    use of pressors: Y ___  N ____    use of sedation: Y ___  N ____    Vent dependent: Y____  N ____    Mode: AC/ CMV (Assist Control/ Continuous Mandatory Ventilation)  RR (machine): 20  TV (machine): 450  FiO2: 40  PEEP: 5  ITime: 1  MAP: 11  PIP: 20                                      ABG - ( 25 Jun 2018 12:41 )  pH, Arterial: 7.52  pH, Blood: x     /  pCO2: 37    /  pO2: 95    / HCO3: 30    / Base Excess: 6.8   /  SaO2: 98                  Weaning time:     Significant exam findings:   MS:  CHEST: B/L breath sounds   ABDOMEN: soft   HEART:S1/S2 regular  SKIN:     No of BMs:       Nutrition:                    06-25-18 @ 07:01  -  06-26-18 @ 07:00  --------------------------------------------------------  IN:    Osmolite: 960 mL  Total IN: 960 mL          LABS:                          10.5   11.47 )-----------( 319      ( 25 Jun 2018 06:43 )             31.6                                               06-25    135  |  97<L>  |  8<L>  ----------------------------<  247<H>  4.0   |  28  |  <0.5<L>    Ca    8.0<L>      25 Jun 2018 06:43  Mg     1.9     06-25                                                                                           Culture - Sputum (collected 24 Jun 2018 08:39)  Source: .Sputum Sputum trap  Gram Stain (25 Jun 2018 14:39):    Numerous polymorphonuclear leukocytes    Rare Squamous epithelial cells per low power field    No organisms seen per oil power field  Preliminary Report (26 Jun 2018 10:18):    Normal Respiratory Estrella present    Culture - Urine (collected 24 Jun 2018 08:03)  Source: .Urine Catheterized  Preliminary Report (25 Jun 2018 18:16):    >100,000 CFU/ml Enterococcus faecalis    10,000 - 49,000 CFU/mL Escherichia coli    Culture - Blood (collected 23 Jun 2018 15:15)  Source: .Blood Blood  Gram Stain (25 Jun 2018 04:17):    Growth in aerobic bottle: Gram Positive Cocci in Clusters    Growth in anaerobic bottle:    Gram Positive Cocci in Clusters  Final Report (25 Jun 2018 22:11):    Growth in aerobic and anaerobic bottles: Coag Negative Staphylococcus    Single set isolate, possible contaminant. Contact    Microbiology if susceptibility testing clinically    indicated.    ***Blood Panel PCR results on this specimen are available    approximately 3 hours after the Gram stain result.***    Gram stain, PCR, and/or culture results may not always    correspond due to difference in methodologies.    ************************************************************    This PCR assay was performed using[employer]    The following targets are tested for: Enterococcus,    vancomycin resistant enterococci, Listeria monocytogenes,    coagulase negative staphylococci, S. aureus,    methicillin resistant S. aureus, Streptococcus agalactiae    (Group B), S.pneumoniae, S. pyogenes (Group A),    Acinetobacter baumannii, Enterobacter cloacae, E. coli,    Klebsiella oxytoca, K. pneumoniae, Proteus sp.,    Serratia marcescens, Haemophilus influenzae,    Neisseria meningitidis, Pseudomonas aeruginosa, Candida    albicans, C. glabrata, C krusei, C parapsilosis,    C. tropicalis and the KPC resistance gene.    "Due to technical problems, Proteus sp. will Not be reported as part of    the BCID panel until further notice"  Organism: Blood Culture PCR (25 Jun 2018 22:11)  Organism: Blood Culture PCR (25 Jun 2018 22:11)                                                       MEDICATIONS  (STANDING):  aspirin enteric coated 81 milliGRAM(s) Oral daily  atorvastatin 80 milliGRAM(s) Oral at bedtime  docusate sodium 100 milliGRAM(s) Oral three times a day  heparin  Injectable 5000 Unit(s) SubCutaneous every 8 hours  melatonin 5 milliGRAM(s) Oral at bedtime  meropenem  IVPB      meropenem  IVPB 1000 milliGRAM(s) IV Intermittent every 8 hours  mirtazapine 7.5 milliGRAM(s) Oral at bedtime  pantoprazole   Suspension 40 milliGRAM(s) Oral before breakfast  senna 2 Tablet(s) Oral at bedtime  sodium chloride 0.9% Bolus 1000 milliLiter(s) IV Bolus once  vancomycin  IVPB 1000 milliGRAM(s) IV Intermittent every 12 hours    MEDICATIONS  (PRN):  acetaminophen   Tablet 650 milliGRAM(s) Oral every 6 hours PRN For Temp greater than 38 C (100.4 F)          Case discussed with staff and family at the bedside

## 2018-06-26 NOTE — PROGRESS NOTE ADULT - ASSESSMENT
IMPRESSION  Severe Sepsis (temp>101F, temp<96.8F, pulse>90 beats/min , resp rate>20/min, wbc>12, systolic hypotension, decreased systolic bp, lactic acidosis) due to suspected Gram negative pneumonia (from [facility] on Vent)  Hx neurogenic bladder, Trach, cervical spinal cord injury    6/23 Blood culture x1 Coagulase negative Staphylococcus (probable contaminant)    MRSA PCR positive, right perihilar opacity & moderate right effusion    U/A Nitrite: Negative/ Leuk Esterase: Large / RBC: 2-5 /HPF / WBC 10-25 /HPF / Bacteria: Moderate /HPF  6/24 Urine C&S >100,000 Enterococcus faecalis    On: meropenem 1000 mg IV Intermittent every 8 hours  vancomycin 1000 mg IV Intermittent every 12 hours    BMI<19    SUGGESTIONs  CHG 4% washes daily and PRN  Await enteroccocal susceptibilities on urine culture  Continue Vanco & meropenem  Monitor Vanco trough & Cr  Repeat blood cultures x2  Repeat white blood cell count

## 2018-06-26 NOTE — PROGRESS NOTE ADULT - SUBJECTIVE AND OBJECTIVE BOX
infectious diseases progress note:  LEXY SANTAMARIA is a 53yMale patient    SEPTIC SHOCK ACUTE RESPIRATORY FAILURE WITH HYPOXIA AND HYPERCAPNIA PNEUMO        ROS:  CONSTITUTIONAL:  Negative fever or chills, feels well, good appetite  EYES:  Negative  blurry vision or double vision  CARDIOVASCULAR:  Negative for chest pain or palpitations  RESPIRATORY:  Negative for cough, wheezing, or SOB   GASTROINTESTINAL:  Negative for nausea, vomiting, diarrhea, constipation, or abdominal pain  GENITOURINARY:  Negative frequency, urgency or dysuria  NEUROLOGIC:  No headache, confusion, dizziness, lightheadedness    Allergies    shellfish (Unknown)    Intolerances        ANTIBIOTICS/RELEVANT:  antimicrobials  meropenem  IVPB      meropenem  IVPB 1000 milliGRAM(s) IV Intermittent every 8 hours  vancomycin  IVPB 1000 milliGRAM(s) IV Intermittent every 12 hours    immunologic:    OTHER:  acetaminophen   Tablet 650 milliGRAM(s) Oral every 6 hours PRN  aspirin enteric coated 81 milliGRAM(s) Oral daily  atorvastatin 80 milliGRAM(s) Oral at bedtime  docusate sodium 100 milliGRAM(s) Oral three times a day  heparin  Injectable 5000 Unit(s) SubCutaneous every 8 hours  melatonin 5 milliGRAM(s) Oral at bedtime  mirtazapine 7.5 milliGRAM(s) Oral at bedtime  pantoprazole   Suspension 40 milliGRAM(s) Oral before breakfast  senna 2 Tablet(s) Oral at bedtime  sodium chloride 0.9% Bolus 1000 milliLiter(s) IV Bolus once      Objective:  T(F): 97.7 (06-25-18 @ 23:31), Max: 99 (06-25-18 @ 15:30)  HR: 94 (06-25-18 @ 23:58) (92 - 100)  BP: 90/54 (06-25-18 @ 23:31) (90/54 - 119/63)  RR: 26 (06-25-18 @ 23:31) (24 - 26)  SpO2: 99% (06-25-18 @ 23:58) (97% - 99%)    PHYSICAL EXAM  Constitutional:Well-developed, well nourished  Eyes:APOLONIA, EOMI  Ear/Nose/Throat: no oral lesion, no sinus tenderness on percussion	  Neck:no JVD, no lymphadenopathy, supple  Respiratory: CTA niki  Cardiovascular: S1S2 RRR, no murmurs  Gastrointestinal:soft, (+) BS, no HSM  Extremities:no e/e/c    06-25    135  |  97<L>  |  8<L>  ----------------------------<  247<H>  4.0   |  28  |  <0.5<L>    Mg     1.9     06-25      <--<9>>7.52  <--<9>>7.51  <--<9>>7.55                          10.5   11.47 )-----------( 319      ( 25 Jun 2018 06:43 )             31.6     Urinalysis Basic - ( 24 Jun 2018 08:03 )    Color: Yellow / Appearance: Turbid / SG: <=1.005 / pH: x  Gluc: x / Ketone: 15  / Bili: Negative / Urobili: 0.2 mg/dL   Blood: x / Protein: Trace mg/dL / Nitrite: Negative   Leuk Esterase: Large / RBC: 2-5 /HPF / WBC 10-25 /HPF   Sq Epi: x / Non Sq Epi: x / Bacteria: Moderate /HPF          Culture - Sputum (collected 24 Jun 2018 08:39)  Source: .Sputum Sputum trap  Gram Stain (25 Jun 2018 14:39):    Numerous polymorphonuclear leukocytes    Rare Squamous epithelial cells per low power field    No organisms seen per oil power field    Culture - Urine (collected 24 Jun 2018 08:03)  Source: .Urine Catheterized  Preliminary Report (25 Jun 2018 18:16):    >100,000 CFU/ml Enterococcus faecalis    10,000 - 49,000 CFU/mL Escherichia coli    Culture - Blood (collected 23 Jun 2018 15:15)  Source: .Blood Blood  Gram Stain (25 Jun 2018 04:17):    Growth in aerobic bottle: Gram Positive Cocci in Clusters    Growth in anaerobic bottle:    Gram Positive Cocci in Clusters  Final Report (25 Jun 2018 22:11):    Growth in aerobic and anaerobic bottles: Coag Negative Staphylococcus    Single set isolate, possible contaminant. Contact    Microbiology if susceptibility testing clinically    indicated.    ***Blood Panel PCR results on this specimen are available    approximately 3 hours after the Gram stain result.***    Gram stain, PCR, and/or culture results may not always    correspond due to difference in methodologies.    ************************************************************    This PCR assay was performed using[employer].    The following targets are tested for: Enterococcus,    vancomycin resistant enterococci, Listeria monocytogenes,    coagulase negative staphylococci, S. aureus,    methicillin resistant S. aureus, Streptococcus agalactiae    (Group B), S.pneumoniae, S. pyogenes (Group A),    Acinetobacter baumannii, Enterobacter cloacae, E. coli,    Klebsiella oxytoca, K. pneumoniae, Proteus sp.,    Serratia marcescens, Haemophilus influenzae,    Neisseria meningitidis, Pseudomonas aeruginosa, Candida    albicans, C. glabrata, C krusei, C parapsilosis,    C. tropicalis and the KPC resistance gene.    "Due to technical problems, Proteus sp. will Not be reported as part of    the BCID panel until further notice"  Organism: Blood Culture PCR (25 Jun 2018 22:11)  Organism: Blood Culture PCR (25 Jun 2018 22:11)      -  Coagulase negative Staphylococcus: Detec      Method Type: PCR

## 2018-06-27 LAB
ANION GAP SERPL CALC-SCNC: 10 MMOL/L — SIGNIFICANT CHANGE UP (ref 7–14)
BUN SERPL-MCNC: 11 MG/DL — SIGNIFICANT CHANGE UP (ref 10–20)
CALCIUM SERPL-MCNC: 8.3 MG/DL — LOW (ref 8.5–10.1)
CHLORIDE SERPL-SCNC: 102 MMOL/L — SIGNIFICANT CHANGE UP (ref 98–110)
CO2 SERPL-SCNC: 25 MMOL/L — SIGNIFICANT CHANGE UP (ref 17–32)
CREAT SERPL-MCNC: <0.5 MG/DL — LOW (ref 0.7–1.5)
CULTURE RESULTS: SIGNIFICANT CHANGE UP
GLUCOSE SERPL-MCNC: 163 MG/DL — HIGH (ref 70–99)
HCT VFR BLD CALC: 31 % — LOW (ref 42–52)
HGB BLD-MCNC: 10.2 G/DL — LOW (ref 14–18)
MAGNESIUM SERPL-MCNC: 1.9 MG/DL — SIGNIFICANT CHANGE UP (ref 1.8–2.4)
MCHC RBC-ENTMCNC: 28.9 PG — SIGNIFICANT CHANGE UP (ref 27–31)
MCHC RBC-ENTMCNC: 32.9 G/DL — SIGNIFICANT CHANGE UP (ref 32–37)
MCV RBC AUTO: 87.8 FL — SIGNIFICANT CHANGE UP (ref 80–94)
NRBC # BLD: 0 /100 WBCS — SIGNIFICANT CHANGE UP (ref 0–0)
PHOSPHATE SERPL-MCNC: 0.9 MG/DL — LOW (ref 2.1–4.9)
PLATELET # BLD AUTO: 296 K/UL — SIGNIFICANT CHANGE UP (ref 130–400)
POTASSIUM SERPL-MCNC: 4.3 MMOL/L — SIGNIFICANT CHANGE UP (ref 3.5–5)
POTASSIUM SERPL-SCNC: 4.3 MMOL/L — SIGNIFICANT CHANGE UP (ref 3.5–5)
RBC # BLD: 3.53 M/UL — LOW (ref 4.7–6.1)
RBC # FLD: 12.9 % — SIGNIFICANT CHANGE UP (ref 11.5–14.5)
SODIUM SERPL-SCNC: 137 MMOL/L — SIGNIFICANT CHANGE UP (ref 135–146)
SPECIMEN SOURCE: SIGNIFICANT CHANGE UP
WBC # BLD: 7.96 K/UL — SIGNIFICANT CHANGE UP (ref 4.8–10.8)
WBC # FLD AUTO: 7.96 K/UL — SIGNIFICANT CHANGE UP (ref 4.8–10.8)

## 2018-06-27 RX ADMIN — ATORVASTATIN CALCIUM 80 MILLIGRAM(S): 80 TABLET, FILM COATED ORAL at 21:57

## 2018-06-27 RX ADMIN — HEPARIN SODIUM 5000 UNIT(S): 5000 INJECTION INTRAVENOUS; SUBCUTANEOUS at 14:18

## 2018-06-27 RX ADMIN — Medication 100 MILLIGRAM(S): at 21:58

## 2018-06-27 RX ADMIN — Medication 100 MILLIGRAM(S): at 17:18

## 2018-06-27 RX ADMIN — MEROPENEM 100 MILLIGRAM(S): 1 INJECTION INTRAVENOUS at 05:41

## 2018-06-27 RX ADMIN — HEPARIN SODIUM 5000 UNIT(S): 5000 INJECTION INTRAVENOUS; SUBCUTANEOUS at 21:55

## 2018-06-27 RX ADMIN — Medication 81 MILLIGRAM(S): at 12:00

## 2018-06-27 RX ADMIN — MEROPENEM 100 MILLIGRAM(S): 1 INJECTION INTRAVENOUS at 21:58

## 2018-06-27 RX ADMIN — MEROPENEM 100 MILLIGRAM(S): 1 INJECTION INTRAVENOUS at 14:18

## 2018-06-27 RX ADMIN — SENNA PLUS 2 TABLET(S): 8.6 TABLET ORAL at 21:58

## 2018-06-27 RX ADMIN — Medication 5 MILLIGRAM(S): at 21:57

## 2018-06-27 RX ADMIN — Medication 250 MILLIGRAM(S): at 05:41

## 2018-06-27 RX ADMIN — HEPARIN SODIUM 5000 UNIT(S): 5000 INJECTION INTRAVENOUS; SUBCUTANEOUS at 05:40

## 2018-06-27 RX ADMIN — MIRTAZAPINE 7.5 MILLIGRAM(S): 45 TABLET, ORALLY DISINTEGRATING ORAL at 21:57

## 2018-06-27 RX ADMIN — Medication 250 MILLIGRAM(S): at 18:26

## 2018-06-27 RX ADMIN — Medication 650 MILLIGRAM(S): at 06:19

## 2018-06-27 RX ADMIN — PANTOPRAZOLE SODIUM 40 MILLIGRAM(S): 20 TABLET, DELAYED RELEASE ORAL at 06:19

## 2018-06-27 RX ADMIN — Medication 100 MILLIGRAM(S): at 05:40

## 2018-06-27 RX ADMIN — Medication 85 MILLIMOLE(S): at 12:00

## 2018-06-27 NOTE — SWALLOW BEDSIDE ASSESSMENT ADULT - SLP PERTINENT HISTORY OF CURRENT PROBLEM
pt admitted with hypoxia. pt being treated for septic shock. PMHx significant for spinal cord injury 2017. pt quadriplegic s/p trach and PEG. pt is on puree with nectar at [facility] and normally has cuffless trach. however trach was changed upon admission for ventilation. as per pt he has been scoped before and reports vocal cords are mobile

## 2018-06-27 NOTE — PROGRESS NOTE ADULT - SUBJECTIVE AND OBJECTIVE BOX
Patient is a 53y old  Male who presents with a chief complaint of desaturation to 88% and respiratory distress (24 Jun 2018 08:12)  SEPTIC SHOCK ACUTE RESPIRATORY FAILURE WITH HYPOXIA AND HYPERCAPNIA PNEUMO  ^UNRESP  No pertinent family history in first degree relatives  MEWS Score  Deep vein thrombosis (DVT) of popliteal vein of right lower extremity, unspecified chronicity  PTSD (post-traumatic stress disorder)  Neurogenic bladder  Respiratory failure  Spinal cord injury, cervical region  Septic shock  H/O tracheostomy  PEG (percutaneous endoscopic gastrostomy) status  UNRESP  Pneumonia of right lung due to infectious organism, unspecified part of lung  Acute respiratory failure with hypoxia and hypercapnia            HOSPITAL DAY NO: 4d      Vital Signs Last 24 Hrs  T(C): 37.8 (27 Jun 2018 07:25), Max: 38.1 (27 Jun 2018 06:17)  T(F): 100.1 (27 Jun 2018 07:25), Max: 100.6 (27 Jun 2018 06:17)  HR: 78 (27 Jun 2018 07:25) (78 - 104)  BP: 105/57 (27 Jun 2018 07:25) (100/66 - 105/57)  BP(mean): 113 (27 Jun 2018 07:25) (77 - 113)  RR: 23 (27 Jun 2018 07:25) (20 - 23)  SpO2: 99% (27 Jun 2018 07:16) (85% - 99%)    use of pressors: Y ___  N ___x_    use of sedation: Y ___  N __x__    Vent dependent: Y___x_  N ____    Mode: AC/ CMV (Assist Control/ Continuous Mandatory Ventilation)  RR (machine): 20  TV (machine): 450  FiO2: 40  PEEP: 5           Mode: AC/ CMV (Assist Control/ Continuous Mandatory Ventilation)  RR (machine): 20  TV (machine): 450  FiO2: 40  PEEP: 5                             ABG - ( 25 Jun 2018 12:41 )  pH, Arterial: 7.52  pH, Blood: x     /  pCO2: 37    /  pO2: 95    / HCO3: 30    / Base Excess: 6.8   /  SaO2: 98                  Weaning time:     Significant exam findings:   MS: awake / alert- follow command  CHEST: B/L breath sounds   ABDOMEN: soft   HEART:S1/S2 regular  SKIN:  intact    No of BMs: o      Nutrition:                    I&O's Detail    26 Jun 2018 07:01  - 27 Jun 2018 07:00  --------------------------------------------------------  IN:    Enteral Tube Flush: 300 mL    IV PiggyBack: 350 mL    Osmolite: 720 mL  Total IN: 1370 mL    OUT:    Indwelling Catheter - Urethral: 2300 mL  Total OUT: 2300 mL    Total NET: -930 mL                LABS:                                                                                                                                                                                            MEDICATIONS  (STANDING):  aspirin enteric coated 81 milliGRAM(s) Oral daily  atorvastatin 80 milliGRAM(s) Oral at bedtime  docusate sodium 100 milliGRAM(s) Oral three times a day  heparin  Injectable 5000 Unit(s) SubCutaneous every 8 hours  melatonin 5 milliGRAM(s) Oral at bedtime  meropenem  IVPB      meropenem  IVPB 1000 milliGRAM(s) IV Intermittent every 8 hours  mirtazapine 7.5 milliGRAM(s) Oral at bedtime  pantoprazole   Suspension 40 milliGRAM(s) Oral before breakfast  senna 2 Tablet(s) Oral at bedtime  sodium chloride 0.9% Bolus 1000 milliLiter(s) IV Bolus once  vancomycin  IVPB 1000 milliGRAM(s) IV Intermittent every 12 hours    MEDICATIONS  (PRN):  acetaminophen   Tablet 650 milliGRAM(s) Oral every 6 hours PRN For Temp greater than 38 C (100.4 F)          Case discussed with staff and family at the bedside Patient is a 53y old  Male who presents with a chief complaint of desaturation to 88% and respiratory distress (24 Jun 2018 08:12)  SEPTIC SHOCK ACUTE RESPIRATORY FAILURE WITH HYPOXIA AND HYPERCAPNIA PNEUMO  ^UNRESP  No pertinent family history in first degree relatives  MEWS Score  Deep vein thrombosis (DVT) of popliteal vein of right lower extremity, unspecified chronicity  PTSD (post-traumatic stress disorder)  Neurogenic bladder  Respiratory failure  Spinal cord injury, cervical region  Septic shock  H/O tracheostomy  PEG (percutaneous endoscopic gastrostomy) status  UNRESP  Pneumonia of right lung due to infectious organism, unspecified part of lung  Acute respiratory failure with hypoxia and hypercapnia            HOSPITAL DAY NO: 4d      Vital Signs Last 24 Hrs  T(C): 37.8 (27 Jun 2018 07:25), Max: 38.1 (27 Jun 2018 06:17)  T(F): 100.1 (27 Jun 2018 07:25), Max: 100.6 (27 Jun 2018 06:17)  HR: 78 (27 Jun 2018 07:25) (78 - 104)  BP: 105/57 (27 Jun 2018 07:25) (100/66 - 105/57)  BP(mean): 113 (27 Jun 2018 07:25) (77 - 113)  RR: 23 (27 Jun 2018 07:25) (20 - 23)  SpO2: 99% (27 Jun 2018 07:16) (85% - 99%)    use of pressors: Y ___  N ___x_    use of sedation: Y ___  N __x__    Vent dependent: Y___x_  N ____    Mode: AC/ CMV (Assist Control/ Continuous Mandatory Ventilation)  RR (machine): 20  TV (machine): 450  FiO2: 40  PEEP: 5           Mode: AC/ CMV (Assist Control/ Continuous Mandatory Ventilation)  RR (machine): 20  TV (machine): 450  FiO2: 40  PEEP: 5                             ABG - ( 25 Jun 2018 12:41 )  pH, Arterial: 7.52  pH, Blood: x     /  pCO2: 37    /  pO2: 95    / HCO3: 30    / Base Excess: 6.8   /  SaO2: 98                  Weaning time: 30 min TP    Significant exam findings:   MS: awake / alert- follow command  CHEST: B/L breath sounds   ABDOMEN: soft   HEART:S1/S2 regular  SKIN:  intact    No of BMs: o      Nutrition:                    I&O's Detail    26 Jun 2018 07:01  -  27 Jun 2018 07:00  --------------------------------------------------------  IN:    Enteral Tube Flush: 300 mL    IV PiggyBack: 350 mL    Osmolite: 720 mL  Total IN: 1370 mL    OUT:    Indwelling Catheter - Urethral: 2300 mL  Total OUT: 2300 mL    Total NET: -930 mL                LABS:                                                                                                                                                                                            MEDICATIONS  (STANDING):  aspirin enteric coated 81 milliGRAM(s) Oral daily  atorvastatin 80 milliGRAM(s) Oral at bedtime  docusate sodium 100 milliGRAM(s) Oral three times a day  heparin  Injectable 5000 Unit(s) SubCutaneous every 8 hours  melatonin 5 milliGRAM(s) Oral at bedtime  meropenem  IVPB      meropenem  IVPB 1000 milliGRAM(s) IV Intermittent every 8 hours  mirtazapine 7.5 milliGRAM(s) Oral at bedtime  pantoprazole   Suspension 40 milliGRAM(s) Oral before breakfast  senna 2 Tablet(s) Oral at bedtime  sodium chloride 0.9% Bolus 1000 milliLiter(s) IV Bolus once  vancomycin  IVPB 1000 milliGRAM(s) IV Intermittent every 12 hours    MEDICATIONS  (PRN):  acetaminophen   Tablet 650 milliGRAM(s) Oral every 6 hours PRN For Temp greater than 38 C (100.4 F)          Case discussed with staff and family at the bedside

## 2018-06-27 NOTE — PROGRESS NOTE ADULT - ATTENDING COMMENTS
continue present rx///repeat blood cults/////assess re weaning    6/27: f/u labs , cont abx continue present rx///repeat blood cults/////assess re weaning    6/27: f/u labs , cont abx, rpt cxr/ blood cx

## 2018-06-27 NOTE — SWALLOW BEDSIDE ASSESSMENT ADULT - SWALLOW EVAL: FUNCTIONAL LEVEL AT TIME OF EVAL
pt communicates via mouthing. as per pt he is not able to vocalize and only mouths even with cuffless fenestrated trach.

## 2018-06-28 LAB
ANION GAP SERPL CALC-SCNC: 12 MMOL/L — SIGNIFICANT CHANGE UP (ref 7–14)
BUN SERPL-MCNC: 9 MG/DL — LOW (ref 10–20)
CALCIUM SERPL-MCNC: 8.5 MG/DL — SIGNIFICANT CHANGE UP (ref 8.5–10.1)
CHLORIDE SERPL-SCNC: 98 MMOL/L — SIGNIFICANT CHANGE UP (ref 98–110)
CO2 SERPL-SCNC: 26 MMOL/L — SIGNIFICANT CHANGE UP (ref 17–32)
CREAT SERPL-MCNC: <0.5 MG/DL — LOW (ref 0.7–1.5)
GLUCOSE SERPL-MCNC: 100 MG/DL — HIGH (ref 70–99)
HCT VFR BLD CALC: 33.3 % — LOW (ref 42–52)
HGB BLD-MCNC: 10.9 G/DL — LOW (ref 14–18)
MAGNESIUM SERPL-MCNC: 1.9 MG/DL — SIGNIFICANT CHANGE UP (ref 1.8–2.4)
MCHC RBC-ENTMCNC: 28.5 PG — SIGNIFICANT CHANGE UP (ref 27–31)
MCHC RBC-ENTMCNC: 32.7 G/DL — SIGNIFICANT CHANGE UP (ref 32–37)
MCV RBC AUTO: 86.9 FL — SIGNIFICANT CHANGE UP (ref 80–94)
NRBC # BLD: 0 /100 WBCS — SIGNIFICANT CHANGE UP (ref 0–0)
PHOSPHATE SERPL-MCNC: 2.5 MG/DL — SIGNIFICANT CHANGE UP (ref 2.1–4.9)
PLATELET # BLD AUTO: 314 K/UL — SIGNIFICANT CHANGE UP (ref 130–400)
POTASSIUM SERPL-MCNC: 4.8 MMOL/L — SIGNIFICANT CHANGE UP (ref 3.5–5)
POTASSIUM SERPL-SCNC: 4.8 MMOL/L — SIGNIFICANT CHANGE UP (ref 3.5–5)
RBC # BLD: 3.83 M/UL — LOW (ref 4.7–6.1)
RBC # FLD: 13 % — SIGNIFICANT CHANGE UP (ref 11.5–14.5)
SODIUM SERPL-SCNC: 136 MMOL/L — SIGNIFICANT CHANGE UP (ref 135–146)
WBC # BLD: 6.84 K/UL — SIGNIFICANT CHANGE UP (ref 4.8–10.8)
WBC # FLD AUTO: 6.84 K/UL — SIGNIFICANT CHANGE UP (ref 4.8–10.8)

## 2018-06-28 RX ORDER — SENNA PLUS 8.6 MG/1
5 TABLET ORAL
Qty: 0 | Refills: 0 | Status: DISCONTINUED | OUTPATIENT
Start: 2018-06-28 | End: 2018-07-10

## 2018-06-28 RX ORDER — DOCUSATE SODIUM 100 MG
100 CAPSULE ORAL
Qty: 0 | Refills: 0 | Status: DISCONTINUED | OUTPATIENT
Start: 2018-06-28 | End: 2018-07-10

## 2018-06-28 RX ADMIN — MEROPENEM 100 MILLIGRAM(S): 1 INJECTION INTRAVENOUS at 14:50

## 2018-06-28 RX ADMIN — ATORVASTATIN CALCIUM 80 MILLIGRAM(S): 80 TABLET, FILM COATED ORAL at 21:35

## 2018-06-28 RX ADMIN — Medication 81 MILLIGRAM(S): at 11:15

## 2018-06-28 RX ADMIN — PANTOPRAZOLE SODIUM 40 MILLIGRAM(S): 20 TABLET, DELAYED RELEASE ORAL at 06:26

## 2018-06-28 RX ADMIN — Medication 100 MILLIGRAM(S): at 05:46

## 2018-06-28 RX ADMIN — MEROPENEM 100 MILLIGRAM(S): 1 INJECTION INTRAVENOUS at 05:47

## 2018-06-28 RX ADMIN — SENNA PLUS 5 MILLILITER(S): 8.6 TABLET ORAL at 22:16

## 2018-06-28 RX ADMIN — Medication 250 MILLIGRAM(S): at 05:47

## 2018-06-28 RX ADMIN — MIRTAZAPINE 7.5 MILLIGRAM(S): 45 TABLET, ORALLY DISINTEGRATING ORAL at 21:34

## 2018-06-28 RX ADMIN — Medication 5 MILLIGRAM(S): at 21:34

## 2018-06-28 RX ADMIN — HEPARIN SODIUM 5000 UNIT(S): 5000 INJECTION INTRAVENOUS; SUBCUTANEOUS at 21:34

## 2018-06-28 RX ADMIN — Medication 100 MILLIGRAM(S): at 19:34

## 2018-06-28 RX ADMIN — MEROPENEM 100 MILLIGRAM(S): 1 INJECTION INTRAVENOUS at 21:33

## 2018-06-28 RX ADMIN — Medication 250 MILLIGRAM(S): at 19:34

## 2018-06-28 RX ADMIN — HEPARIN SODIUM 5000 UNIT(S): 5000 INJECTION INTRAVENOUS; SUBCUTANEOUS at 05:46

## 2018-06-28 RX ADMIN — HEPARIN SODIUM 5000 UNIT(S): 5000 INJECTION INTRAVENOUS; SUBCUTANEOUS at 14:43

## 2018-06-28 NOTE — PROGRESS NOTE ADULT - SUBJECTIVE AND OBJECTIVE BOX
Patient is a 53y old  Male who presents with a chief complaint of desaturation to 88% and respiratory distress (24 Jun 2018 08:12)  SEPTIC SHOCK ACUTE RESPIRATORY FAILURE WITH HYPOXIA AND HYPERCAPNIA PNEUMO  ^UNRESP  No pertinent family history in first degree relatives  MEWS Score  Deep vein thrombosis (DVT) of popliteal vein of right lower extremity, unspecified chronicity  PTSD (post-traumatic stress disorder)  Neurogenic bladder  Respiratory failure  Spinal cord injury, cervical region  Septic shock  H/O tracheostomy  PEG (percutaneous endoscopic gastrostomy) status  UNRESP  Pneumonia of right lung due to infectious organism, unspecified part of lung  Acute respiratory failure with hypoxia and hypercapnia            HOSPITAL DAY NO: 5d      Vital Signs Last 24 Hrs  T(C): 37.1 (28 Jun 2018 07:39), Max: 37.2 (27 Jun 2018 23:39)  T(F): 98.8 (28 Jun 2018 07:39), Max: 99 (27 Jun 2018 23:39)  HR: 106 (28 Jun 2018 07:39) (87 - 106)  BP: 124/75 (28 Jun 2018 07:39) (108/64 - 124/75)  BP(mean): 94 (28 Jun 2018 07:39) (89 - 94)  RR: 20 (28 Jun 2018 07:39) (20 - 24)  SpO2: 100% (28 Jun 2018 07:10) (99% - 100%)    use of pressors: Y ___  N ____x    use of sedation: Y ___  N __x__    Vent dependent: Y__x__  N ____    Mode: AC/ CMV (Assist Control/ Continuous Mandatory Ventilation)  RR (machine): 20  TV (machine): 450  FiO2: 40  PEEP: 5  ITime: 1  MAP: 11  PIP: 19           Mode: AC/ CMV (Assist Control/ Continuous Mandatory Ventilation)  RR (machine): 20  TV (machine): 450  FiO2: 40  PEEP: 5  ITime: 1  MAP: 11  PIP: 19                                 Weaning time:     Significant exam findings:   MS: awake and alert , follow s command   CHEST: B/L breath sounds   ABDOMEN: soft   HEART:S1/S2 regular  SKIN:  intact    No of BMs:       Nutrition:   I&O's Detail    27 Jun 2018 07:01  -  28 Jun 2018 07:00  --------------------------------------------------------  IN:    Enteral Tube Flush: 300 mL    IV PiggyBack: 350 mL    Osmolite: 720 mL  Total IN: 1370 mL    OUT:    Indwelling Catheter - Urethral: 2925 mL  Total OUT: 2925 mL    Total NET: -1555 mL                LABS:                          10.9   6.84  )-----------( 314      ( 28 Jun 2018 05:34 )             33.3                                               06-28    136  |  98  |  9<L>  ----------------------------<  100<H>  4.8   |  26  |  <0.5<L>    Ca    8.5      28 Jun 2018 05:34  Phos  2.5     06-28  Mg     1.9     06-28                                                                                                                                            MEDICATIONS  (STANDING):  aspirin enteric coated 81 milliGRAM(s) Oral daily  atorvastatin 80 milliGRAM(s) Oral at bedtime  docusate sodium 100 milliGRAM(s) Oral three times a day  heparin  Injectable 5000 Unit(s) SubCutaneous every 8 hours  melatonin 5 milliGRAM(s) Oral at bedtime  meropenem  IVPB      meropenem  IVPB 1000 milliGRAM(s) IV Intermittent every 8 hours  mirtazapine 7.5 milliGRAM(s) Oral at bedtime  pantoprazole   Suspension 40 milliGRAM(s) Oral before breakfast  senna 2 Tablet(s) Oral at bedtime  sodium chloride 0.9% Bolus 1000 milliLiter(s) IV Bolus once  vancomycin  IVPB 1000 milliGRAM(s) IV Intermittent every 12 hours    MEDICATIONS  (PRN):  acetaminophen   Tablet 650 milliGRAM(s) Oral every 6 hours PRN For Temp greater than 38 C (100.4 F)          Case discussed with staff and family at the bedside Patient is a 53y old  Male who presents with a chief complaint of desaturation to 88% and respiratory distress (24 Jun 2018 08:12)  SEPTIC SHOCK ACUTE RESPIRATORY FAILURE WITH HYPOXIA AND HYPERCAPNIA PNEUMO  ^UNRESP  No pertinent family history in first degree relatives  MEWS Score  Deep vein thrombosis (DVT) of popliteal vein of right lower extremity, unspecified chronicity  PTSD (post-traumatic stress disorder)  Neurogenic bladder  Respiratory failure  Spinal cord injury, cervical region  Septic shock  H/O tracheostomy  PEG (percutaneous endoscopic gastrostomy) status  UNRESP  Pneumonia of right lung due to infectious organism, unspecified part of lung  Acute respiratory failure with hypoxia and hypercapnia            HOSPITAL DAY NO: 5d      Vital Signs Last 24 Hrs  T(C): 37.1 (28 Jun 2018 07:39), Max: 37.2 (27 Jun 2018 23:39)  T(F): 98.8 (28 Jun 2018 07:39), Max: 99 (27 Jun 2018 23:39)  HR: 106 (28 Jun 2018 07:39) (87 - 106)  BP: 124/75 (28 Jun 2018 07:39) (108/64 - 124/75)  BP(mean): 94 (28 Jun 2018 07:39) (89 - 94)  RR: 20 (28 Jun 2018 07:39) (20 - 24)  SpO2: 100% (28 Jun 2018 07:10) (99% - 100%)    use of pressors: Y ___  N ____x    use of sedation: Y ___  N __x__    Vent dependent: Y__x__  N ____    Mode: AC/ CMV (Assist Control/ Continuous Mandatory Ventilation)  RR (machine): 20  TV (machine): 450  FiO2: 40  PEEP: 5  ITime: 1  MAP: 11  PIP: 19           Mode: AC/ CMV (Assist Control/ Continuous Mandatory Ventilation)  RR (machine): 20  TV (machine): 450  FiO2: 40  PEEP: 5  ITime: 1  MAP: 11  PIP: 19                                 Weaning time: 4 hours     Significant exam findings:   MS: awake and alert , follow s command   CHEST: B/L breath sounds   ABDOMEN: soft   HEART:S1/S2 regular  SKIN:  intact    No of BMs: 0      Nutrition:   I&O's Detail    27 Jun 2018 07:01  -  28 Jun 2018 07:00  --------------------------------------------------------  IN:    Enteral Tube Flush: 300 mL    IV PiggyBack: 350 mL    Osmolite: 720 mL  Total IN: 1370 mL    OUT:    Indwelling Catheter - Urethral: 2925 mL  Total OUT: 2925 mL    Total NET: -1555 mL                LABS:                          10.9   6.84  )-----------( 314      ( 28 Jun 2018 05:34 )             33.3                                               06-28    136  |  98  |  9<L>  ----------------------------<  100<H>  4.8   |  26  |  <0.5<L>    Ca    8.5      28 Jun 2018 05:34  Phos  2.5     06-28  Mg     1.9     06-28                                                                                                                                            MEDICATIONS  (STANDING):  aspirin enteric coated 81 milliGRAM(s) Oral daily  atorvastatin 80 milliGRAM(s) Oral at bedtime  docusate sodium 100 milliGRAM(s) Oral three times a day  heparin  Injectable 5000 Unit(s) SubCutaneous every 8 hours  melatonin 5 milliGRAM(s) Oral at bedtime  meropenem  IVPB      meropenem  IVPB 1000 milliGRAM(s) IV Intermittent every 8 hours  mirtazapine 7.5 milliGRAM(s) Oral at bedtime  pantoprazole   Suspension 40 milliGRAM(s) Oral before breakfast  senna 2 Tablet(s) Oral at bedtime  sodium chloride 0.9% Bolus 1000 milliLiter(s) IV Bolus once  vancomycin  IVPB 1000 milliGRAM(s) IV Intermittent every 12 hours    MEDICATIONS  (PRN):  acetaminophen   Tablet 650 milliGRAM(s) Oral every 6 hours PRN For Temp greater than 38 C (100.4 F)          Case discussed with staff and family at the bedside

## 2018-06-28 NOTE — CHART NOTE - NSCHARTNOTEFT_GEN_A_CORE
Registered Dietitian At Risk Follow-Up     Patient Profile Reviewed                           Yes []   No []     Nutrition History Previously Obtained        Yes []  No []       Pertinent Subjective Information:     Pertinent Medical Interventions:     Diet order: Osmolite 1.5 240ml q 6hrs (1440kcal, 60m pro, 732ml free water)     Anthropometrics:  - Ht. 75"  - Wt. 159#/72kg, 168#/76kg (adm wt)      Pertinent Lab Data:     Pertinent Meds: heparin, abx, lipitor, colace, protonix, senna, remeron     Physical Findings:  - Appearance: trached on vent   - GI function: LBM 6/25   - Tubes: PEG in place   - Oral/Mouth cavity: per SLP reccs on 6/27--NPO with alternate means of nutrition/hydration  - Skin: Abel 14, excoriation      Nutrition Requirements  Weight Used: 168#/76kg     Estimated Calorie Needs: 7275-1326 kcal/day (MSJ x 1.2-1.3 AF)--remains as of 6/25     Estimated Protein Needs: 76-91 gm/day (1-1.2 gmkg CBW)--remains as of 6/25     Estimated Fluid Needs: per vent team    Nutrient Intake: only meeting 71% kcal needs and 79% of pro needs with current regimen      Previous Nutrition Diagnosis: Less than optimal enteral nutrition composition or modality         Nutrition Intervention: enteral nutrition     Recommendations:  1. Switch to the following TF regimen: Osmolite 1.5 240ml q 4hrs to provide 2160kcal, 90gm pro, 1097ml free water. Additional flushes per LIP.     Goal/Expected Outcome: Pt to receive and tolerate >85%, but not exceed >105% of estimated nutrient needs within 4 days     Indicator/Monitoring: RD to monitor diet order, energy intake, body comp, glucose profile, NFPF Registered Dietitian At Risk Follow-Up     Patient Profile Reviewed                           Yes [x]   No []     Nutrition History Previously Obtained        Yes [x]  No []       Pertinent Subjective Information: Spoke to RN who reports that pt is tolerating current TF regimen well with no issues. Also, she states that they increased his senna dose as pt hasn't had a bowel movement x3 days.      Pertinent Medical Interventions: Pt has been off the vent 4-6 hrs; f/u blood cx, cxr no change.     Diet order: Osmolite 1.5 240ml q 6hrs (1440kcal, 60m pro, 732ml free water)     Anthropometrics:  - Ht. 75"  - Current Wt. 159#/72kg, 168#/76kg (adm wt)      Pertinent Lab Data: Reviewed (6/28): BUN 9, Cr. <0.5, Glu 100      Pertinent Meds: heparin, abx, lipitor, colace, protonix, senna, remeron     Physical Findings:  - Appearance: trached/off vent   - GI function: LBM 6/25   - Tubes: PEG in place   - Oral/Mouth cavity: per SLP reccs on 6/27--NPO with alternate means of nutrition/hydration  - Skin: Abel 14, excoriation      Nutrition Requirements  Weight Used: 168#/76kg     Estimated Calorie Needs: 3408-0537 kcal/day (MSJ x 1.2-1.3 AF)--remains as of 6/25     Estimated Protein Needs: 76-91 gm/day (1-1.2 gmkg CBW)--remains as of 6/25     Estimated Fluid Needs: per vent team    Nutrient Intake: only meeting 71% kcal needs and 79% of pro needs with current regimen      Previous Nutrition Diagnosis: Less than optimal enteral nutrition composition or modality (ongoing)          Nutrition Intervention: enteral nutrition     Recommendations:  1. Activate pending diet order: Osmolite 1.5 240ml q 4hrs to provide 2160kcal, 90gm pro, 1097ml free water. Additional flushes per LIP.     Goal/Expected Outcome: Pt to receive and tolerate >85%, but not exceed >105% of estimated nutrient needs within 4 days     Indicator/Monitoring: RD to monitor diet order, energy intake, body comp, nutrition focused physical findings

## 2018-06-28 NOTE — PROGRESS NOTE ADULT - ATTENDING COMMENTS
continue present rx///repeat blood cults/////assess re weaning    6/27: f/u labs , cont abx, rpt cxr/ blood cx  6/28: f/u blood cx , continue present rx///repeat blood cults/////assess re weaning    6/27: f/u labs , cont abx, rpt cxr/ blood cx  6/28: f/u blood cx , off vent 4-6 hours , cxr no change

## 2018-06-28 NOTE — CHART NOTE - NSCHARTNOTEFT_GEN_A_CORE
SHANE : no stool in rectum , scant brown stool on glove    - will order tap water enema      rn aware

## 2018-06-29 RX ADMIN — SENNA PLUS 5 MILLILITER(S): 8.6 TABLET ORAL at 05:42

## 2018-06-29 RX ADMIN — Medication 250 MILLIGRAM(S): at 05:41

## 2018-06-29 RX ADMIN — MEROPENEM 100 MILLIGRAM(S): 1 INJECTION INTRAVENOUS at 15:20

## 2018-06-29 RX ADMIN — ATORVASTATIN CALCIUM 80 MILLIGRAM(S): 80 TABLET, FILM COATED ORAL at 21:25

## 2018-06-29 RX ADMIN — Medication 100 MILLIGRAM(S): at 05:43

## 2018-06-29 RX ADMIN — SENNA PLUS 5 MILLILITER(S): 8.6 TABLET ORAL at 18:45

## 2018-06-29 RX ADMIN — MEROPENEM 100 MILLIGRAM(S): 1 INJECTION INTRAVENOUS at 05:41

## 2018-06-29 RX ADMIN — MIRTAZAPINE 7.5 MILLIGRAM(S): 45 TABLET, ORALLY DISINTEGRATING ORAL at 21:24

## 2018-06-29 RX ADMIN — HEPARIN SODIUM 5000 UNIT(S): 5000 INJECTION INTRAVENOUS; SUBCUTANEOUS at 14:11

## 2018-06-29 RX ADMIN — HEPARIN SODIUM 5000 UNIT(S): 5000 INJECTION INTRAVENOUS; SUBCUTANEOUS at 05:42

## 2018-06-29 RX ADMIN — Medication 100 MILLIGRAM(S): at 18:45

## 2018-06-29 RX ADMIN — PANTOPRAZOLE SODIUM 40 MILLIGRAM(S): 20 TABLET, DELAYED RELEASE ORAL at 06:02

## 2018-06-29 RX ADMIN — Medication 250 MILLIGRAM(S): at 18:45

## 2018-06-29 RX ADMIN — MEROPENEM 100 MILLIGRAM(S): 1 INJECTION INTRAVENOUS at 21:27

## 2018-06-29 RX ADMIN — Medication 81 MILLIGRAM(S): at 11:42

## 2018-06-29 RX ADMIN — Medication 5 MILLIGRAM(S): at 21:24

## 2018-06-29 RX ADMIN — HEPARIN SODIUM 5000 UNIT(S): 5000 INJECTION INTRAVENOUS; SUBCUTANEOUS at 21:25

## 2018-06-29 NOTE — SWALLOW FEES ASSESSMENT ADULT - ROSENBEK'S PENETRATION ASPIRATION SCALE
(5) material contacts vocal cords, visible residue remains (penetration) (1) no aspiration, material does not enter airway + penetration noted after attempting liquid wash strategy

## 2018-06-29 NOTE — SWALLOW FEES ASSESSMENT ADULT - RECOMMENDED CONSISTENCY
continue peg as primary means of nutrition/hydration. pt can have pleasure feeds of puree and nectar thick liquids. SLP will f/u with trial of mechanica soft consistency and thin liquids

## 2018-06-29 NOTE — SWALLOW FEES ASSESSMENT ADULT - DIAGNOSTIC IMPRESSIONS
mod pharyngeal dysphagia for thins. mild pharyngeal dysphagia for nectar and soft consistency. trace pharyngeal dysphagia for puree mod pharyngeal dysphagia for thins. mild pharyngeal dysphagia for nectar and soft consistency. trace pharyngeal dysphagia for puree. suspected esophageal dysfuncton evidenced by backflow through UES

## 2018-06-29 NOTE — PROGRESS NOTE ADULT - ATTENDING COMMENTS
continue present rx///repeat blood cults/////assess re weaning    6/27: f/u labs , cont abx, rpt cxr/ blood cx  6/28: f/u blood cx , off vent 4-6 hours , cxr no change    6/29: blood cx : NG

## 2018-06-29 NOTE — SWALLOW FEES ASSESSMENT ADULT - PHARYNGEAL PHASE COMMENTS
mild-mod pharyngeal dysphagia trace pharyngeal dysphagia for puree mild pharyngeal dysphagia for nectar thick liquids mild pharyngeal dysphagia

## 2018-06-29 NOTE — PROGRESS NOTE ADULT - SUBJECTIVE AND OBJECTIVE BOX
Patient is a 53y old  Male who presents with a chief complaint of desaturation to 88% and respiratory distress (24 Jun 2018 08:12)    SEPTIC SHOCK ACUTE RESPIRATORY FAILURE WITH HYPOXIA AND HYPERCAPNIA PNEUMO  ^UNRESP  No pertinent family history in first degree relatives  MEWS Score  Deep vein thrombosis (DVT) of popliteal vein of right lower extremity, unspecified chronicity  PTSD (post-traumatic stress disorder)  Neurogenic bladder  Respiratory failure  Spinal cord injury, cervical region  Septic shock  H/O tracheostomy  PEG (percutaneous endoscopic gastrostomy) status  UNRESP  Pneumonia of right lung due to infectious organism, unspecified part of lung  Acute respiratory failure with hypoxia and hypercapnia        HOSPITAL DAY NO: 6d      Vital Signs Last 24 Hrs  T(C): 36.7 (29 Jun 2018 07:37), Max: 37.4 (28 Jun 2018 23:36)  T(F): 98 (29 Jun 2018 07:37), Max: 99.4 (28 Jun 2018 23:36)  HR: 103 (29 Jun 2018 07:37) (81 - 109)  BP: 107/61 (29 Jun 2018 07:37) (95/61 - 107/61)  BP(mean): 79 (29 Jun 2018 07:37) (71 - 79)  RR: 20 (29 Jun 2018 07:37) (20 - 21)  SpO2: 100% (28 Jun 2018 23:48) (99% - 100%)    use of pressors: Y ___  N ____x    use of sedation: Y ___  N ____x    Vent dependent: Y____x  N ____    Mode: AC/ CMV (Assist Control/ Continuous Mandatory Ventilation)  RR (machine): 20  TV (machine): 450  FiO2: 40  PEEP: 5  ITime: 1  MAP: 11  PIP: 17           Mode: AC/ CMV (Assist Control/ Continuous Mandatory Ventilation)  RR (machine): 20  TV (machine): 450  FiO2: 40  PEEP: 5  ITime: 1  MAP: 11  PIP: 17                                 Weaning time: 3 hours +    Significant exam findings:   MS: Ax O 3  CHEST: B/L breath sounds   ABDOMEN: soft   HEART:S1/S2 regular  SKIN: intact    No of BMs: 1 large        Nutrition:                I&O's Detail    28 Jun 2018 07:01  -  29 Jun 2018 07:00  --------------------------------------------------------  IN:    Enteral Tube Flush: 600 mL    Osmolite: 1440 mL    Solution: 150 mL    Solution: 500 mL  Total IN: 2690 mL    OUT:    Indwelling Catheter - Urethral: 2100 mL  Total OUT: 2100 mL    Total NET: 590 mL              LABS:                          10.9   6.84  )-----------( 314      ( 28 Jun 2018 05:34 )             33.3                                               06-28    136  |  98  |  9<L>  ----------------------------<  100<H>  4.8   |  26  |  <0.5<L>    Ca    8.5      28 Jun 2018 05:34  Phos  2.5     06-28  Mg     1.9     06-28                                                                                           Culture - Blood (collected 27 Jun 2018 18:27)  Source: .Blood None  Preliminary Report (29 Jun 2018 01:02):    No growth to date.                                                       MEDICATIONS  (STANDING):  aspirin enteric coated 81 milliGRAM(s) Oral daily  atorvastatin 80 milliGRAM(s) Oral at bedtime  docusate sodium Liquid 100 milliGRAM(s) Oral two times a day  heparin  Injectable 5000 Unit(s) SubCutaneous every 8 hours  melatonin 5 milliGRAM(s) Oral at bedtime  meropenem  IVPB      meropenem  IVPB 1000 milliGRAM(s) IV Intermittent every 8 hours  mirtazapine 7.5 milliGRAM(s) Oral at bedtime  pantoprazole   Suspension 40 milliGRAM(s) Oral before breakfast  senna Syrup 5 milliLiter(s) Oral two times a day  sodium chloride 0.9% Bolus 1000 milliLiter(s) IV Bolus once  vancomycin  IVPB 1000 milliGRAM(s) IV Intermittent every 12 hours    MEDICATIONS  (PRN):  acetaminophen   Tablet 650 milliGRAM(s) Oral every 6 hours PRN For Temp greater than 38 C (100.4 F)          Case discussed with staff and family at the bedside

## 2018-06-29 NOTE — SWALLOW FEES ASSESSMENT ADULT - SLP GENERAL OBSERVATIONS
pt awake alert without c/o pain. pt on o2 via tpiece. no voicing upon occlusion. pt states this is baseline and communicates via mouthing

## 2018-06-30 RX ADMIN — PANTOPRAZOLE SODIUM 40 MILLIGRAM(S): 20 TABLET, DELAYED RELEASE ORAL at 06:36

## 2018-06-30 RX ADMIN — MEROPENEM 100 MILLIGRAM(S): 1 INJECTION INTRAVENOUS at 21:20

## 2018-06-30 RX ADMIN — HEPARIN SODIUM 5000 UNIT(S): 5000 INJECTION INTRAVENOUS; SUBCUTANEOUS at 05:26

## 2018-06-30 RX ADMIN — ATORVASTATIN CALCIUM 80 MILLIGRAM(S): 80 TABLET, FILM COATED ORAL at 21:20

## 2018-06-30 RX ADMIN — SENNA PLUS 5 MILLILITER(S): 8.6 TABLET ORAL at 18:51

## 2018-06-30 RX ADMIN — MIRTAZAPINE 7.5 MILLIGRAM(S): 45 TABLET, ORALLY DISINTEGRATING ORAL at 21:21

## 2018-06-30 RX ADMIN — Medication 81 MILLIGRAM(S): at 14:14

## 2018-06-30 RX ADMIN — HEPARIN SODIUM 5000 UNIT(S): 5000 INJECTION INTRAVENOUS; SUBCUTANEOUS at 21:22

## 2018-06-30 RX ADMIN — SENNA PLUS 5 MILLILITER(S): 8.6 TABLET ORAL at 05:27

## 2018-06-30 RX ADMIN — HEPARIN SODIUM 5000 UNIT(S): 5000 INJECTION INTRAVENOUS; SUBCUTANEOUS at 14:15

## 2018-06-30 RX ADMIN — Medication 250 MILLIGRAM(S): at 05:24

## 2018-06-30 RX ADMIN — Medication 100 MILLIGRAM(S): at 05:26

## 2018-06-30 RX ADMIN — MEROPENEM 100 MILLIGRAM(S): 1 INJECTION INTRAVENOUS at 05:24

## 2018-06-30 RX ADMIN — Medication 100 MILLIGRAM(S): at 18:51

## 2018-06-30 RX ADMIN — MEROPENEM 100 MILLIGRAM(S): 1 INJECTION INTRAVENOUS at 14:16

## 2018-06-30 RX ADMIN — Medication 5 MILLIGRAM(S): at 21:22

## 2018-06-30 RX ADMIN — Medication 250 MILLIGRAM(S): at 18:53

## 2018-06-30 NOTE — PROGRESS NOTE ADULT - SUBJECTIVE AND OBJECTIVE BOX
Patient is a 53y old  Male who presents with a chief complaint of desaturation to 88% and respiratory distress (24 Jun 2018 08:12)  SEPTIC SHOCK ACUTE RESPIRATORY FAILURE WITH HYPOXIA AND HYPERCAPNIA PNEUMO  ^UNRESP  No pertinent family history in first degree relatives  MEWS Score  Deep vein thrombosis (DVT) of popliteal vein of right lower extremity, unspecified chronicity  PTSD (post-traumatic stress disorder)  Neurogenic bladder  Respiratory failure  Spinal cord injury, cervical region  Septic shock  H/O tracheostomy  PEG (percutaneous endoscopic gastrostomy) status  UNRESP  Pneumonia of right lung due to infectious organism, unspecified part of lung  Acute respiratory failure with hypoxia and hypercapnia    SEPTIC SHOCK ACUTE RESPIRATORY FAILURE WITH HYPOXIA AND HYPERCAPNIA PNEUMO  ^UNRESP  No pertinent family history in first degree relatives  MEWS Score  Deep vein thrombosis (DVT) of popliteal vein of right lower extremity, unspecified chronicity  PTSD (post-traumatic stress disorder)  Neurogenic bladder  Respiratory failure  Spinal cord injury, cervical region  Septic shock  H/O tracheostomy  PEG (percutaneous endoscopic gastrostomy) status  UNRESP  Pneumonia of right lung due to infectious organism, unspecified part of lung  Acute respiratory failure with hypoxia and hypercapnia  SEPTIC SHOCK ACUTE RESPIRATORY FAILURE WITH HYPOXIA AND HYPERCAPNIA PNEUMO [Active]  Deep vein thrombosis (DVT) of popliteal vein of right lower extremity, unspecified chronicity [Active]  PTSD (post-traumatic stress disorder) [Active]  Neurogenic bladder [Active]  Respiratory failure [Active]  Spinal cord injury, cervical region [Active]  Septic shock [Active]  H/O tracheostomy [Active]  PEG (percutaneous endoscopic gastrostomy) status [Active]  Pneumonia of right lung due to infectious organism, unspecified part of lung [Active]  Acute respiratory failure with hypoxia and hypercapnia [Active]          HOSPITAL DAY NO: 7d      Vital Signs Last 24 Hrs  T(C): 37.9 (30 Jun 2018 07:59), Max: 37.9 (30 Jun 2018 07:59)  T(F): 100.2 (30 Jun 2018 07:59), Max: 100.2 (30 Jun 2018 07:59)  HR: 110 (30 Jun 2018 07:59) (89 - 110)  BP: 119/71 (30 Jun 2018 07:59) (108/70 - 119/71)  BP(mean): 90 (30 Jun 2018 07:59) (86 - 90)  RR: 32 (30 Jun 2018 07:59) (20 - 32)  SpO2: 99% (30 Jun 2018 00:25) (98% - 100%)    use of pressors: Y ___  N _x___    use of sedation: Y ___  N _x___    Vent dependent: Y__x__  N ____    Mode: AC/ CMV (Assist Control/ Continuous Mandatory Ventilation)  RR (machine): 20  TV (machine): 450  FiO2: 40  PEEP: 5  ITime: 1  MAP: 11  PIP: 24           Mode: AC/ CMV (Assist Control/ Continuous Mandatory Ventilation)  RR (machine): 20  TV (machine): 450  FiO2: 40  PEEP: 5  ITime: 1  MAP: 11  PIP: 24                                 Weaning time:12 hrs     Significant exam findings:   MSa/a:  CHEST: B/L breath sounds   ABDOMEN: soft   HEART:S1/S2 regular  SKIN: ok    No of BMs: 0      Nutrition:                    06-29-18 @ 07:01  -  06-30-18 @ 07:00  --------------------------------------------------------  IN:    Osmolite: 1440 mL  Total IN: 1440 mL    in 2440  out 1100      LABS:                                                                                                                                           Culture - Blood (collected 27 Jun 2018 18:27)  Source: .Blood None  Preliminary Report (29 Jun 2018 01:02):    No growth to date.                                                       MEDICATIONS  (STANDING):  aspirin enteric coated 81 milliGRAM(s) Oral daily  atorvastatin 80 milliGRAM(s) Oral at bedtime  docusate sodium Liquid 100 milliGRAM(s) Oral two times a day  heparin  Injectable 5000 Unit(s) SubCutaneous every 8 hours  melatonin 5 milliGRAM(s) Oral at bedtime  meropenem  IVPB      meropenem  IVPB 1000 milliGRAM(s) IV Intermittent every 8 hours  mirtazapine 7.5 milliGRAM(s) Oral at bedtime  pantoprazole   Suspension 40 milliGRAM(s) Oral before breakfast  senna Syrup 5 milliLiter(s) Oral two times a day  sodium chloride 0.9% Bolus 1000 milliLiter(s) IV Bolus once  vancomycin  IVPB 1000 milliGRAM(s) IV Intermittent every 12 hours    MEDICATIONS  (PRN):  acetaminophen   Tablet 650 milliGRAM(s) Oral every 6 hours PRN For Temp greater than 38 C (100.4 F)          Case discussed with staff and family at the bedside

## 2018-06-30 NOTE — PROGRESS NOTE ADULT - ATTENDING COMMENTS
continue present rx///repeat blood cults/////assess re weaning    6/27: f/u labs , cont abx, rpt cxr/ blood cx  6/28: f/u blood cx , off vent 4-6 hours , cxr no change    66/30 continue weaning 24 hrs speech / swallow

## 2018-07-01 LAB
ANION GAP SERPL CALC-SCNC: 12 MMOL/L — SIGNIFICANT CHANGE UP (ref 7–14)
BASOPHILS # BLD AUTO: 0.06 K/UL — SIGNIFICANT CHANGE UP (ref 0–0.2)
BASOPHILS NFR BLD AUTO: 1 % — SIGNIFICANT CHANGE UP (ref 0–1)
BUN SERPL-MCNC: 13 MG/DL — SIGNIFICANT CHANGE UP (ref 10–20)
CALCIUM SERPL-MCNC: 8.7 MG/DL — SIGNIFICANT CHANGE UP (ref 8.5–10.1)
CHLORIDE SERPL-SCNC: 94 MMOL/L — LOW (ref 98–110)
CO2 SERPL-SCNC: 29 MMOL/L — SIGNIFICANT CHANGE UP (ref 17–32)
CREAT SERPL-MCNC: <0.5 MG/DL — LOW (ref 0.7–1.5)
EOSINOPHIL # BLD AUTO: 0.18 K/UL — SIGNIFICANT CHANGE UP (ref 0–0.7)
EOSINOPHIL NFR BLD AUTO: 2.9 % — SIGNIFICANT CHANGE UP (ref 0–8)
GLUCOSE SERPL-MCNC: 119 MG/DL — HIGH (ref 70–99)
HCT VFR BLD CALC: 35.3 % — LOW (ref 42–52)
HGB BLD-MCNC: 11.3 G/DL — LOW (ref 14–18)
IMM GRANULOCYTES NFR BLD AUTO: 1.5 % — HIGH (ref 0.1–0.3)
LYMPHOCYTES # BLD AUTO: 1.99 K/UL — SIGNIFICANT CHANGE UP (ref 1.2–3.4)
LYMPHOCYTES # BLD AUTO: 32.5 % — SIGNIFICANT CHANGE UP (ref 20.5–51.1)
MAGNESIUM SERPL-MCNC: 2 MG/DL — SIGNIFICANT CHANGE UP (ref 1.8–2.4)
MCHC RBC-ENTMCNC: 28.3 PG — SIGNIFICANT CHANGE UP (ref 27–31)
MCHC RBC-ENTMCNC: 32 G/DL — SIGNIFICANT CHANGE UP (ref 32–37)
MCV RBC AUTO: 88.5 FL — SIGNIFICANT CHANGE UP (ref 80–94)
MONOCYTES # BLD AUTO: 0.52 K/UL — SIGNIFICANT CHANGE UP (ref 0.1–0.6)
MONOCYTES NFR BLD AUTO: 8.5 % — SIGNIFICANT CHANGE UP (ref 1.7–9.3)
NEUTROPHILS # BLD AUTO: 3.28 K/UL — SIGNIFICANT CHANGE UP (ref 1.4–6.5)
NEUTROPHILS NFR BLD AUTO: 53.6 % — SIGNIFICANT CHANGE UP (ref 42.2–75.2)
PLATELET # BLD AUTO: 468 K/UL — HIGH (ref 130–400)
POTASSIUM SERPL-MCNC: 5.1 MMOL/L — HIGH (ref 3.5–5)
POTASSIUM SERPL-SCNC: 5.1 MMOL/L — HIGH (ref 3.5–5)
RBC # BLD: 3.99 M/UL — LOW (ref 4.7–6.1)
RBC # FLD: 12.8 % — SIGNIFICANT CHANGE UP (ref 11.5–14.5)
SODIUM SERPL-SCNC: 135 MMOL/L — SIGNIFICANT CHANGE UP (ref 135–146)
WBC # BLD: 6.12 K/UL — SIGNIFICANT CHANGE UP (ref 4.8–10.8)
WBC # FLD AUTO: 6.12 K/UL — SIGNIFICANT CHANGE UP (ref 4.8–10.8)

## 2018-07-01 PROCEDURE — 93970 EXTREMITY STUDY: CPT | Mod: 26

## 2018-07-01 RX ADMIN — HEPARIN SODIUM 5000 UNIT(S): 5000 INJECTION INTRAVENOUS; SUBCUTANEOUS at 13:37

## 2018-07-01 RX ADMIN — MEROPENEM 100 MILLIGRAM(S): 1 INJECTION INTRAVENOUS at 05:05

## 2018-07-01 RX ADMIN — Medication 81 MILLIGRAM(S): at 12:09

## 2018-07-01 RX ADMIN — HEPARIN SODIUM 5000 UNIT(S): 5000 INJECTION INTRAVENOUS; SUBCUTANEOUS at 05:06

## 2018-07-01 RX ADMIN — Medication 5 MILLIGRAM(S): at 21:29

## 2018-07-01 RX ADMIN — Medication 100 MILLIGRAM(S): at 18:18

## 2018-07-01 RX ADMIN — Medication 250 MILLIGRAM(S): at 18:19

## 2018-07-01 RX ADMIN — MEROPENEM 100 MILLIGRAM(S): 1 INJECTION INTRAVENOUS at 21:30

## 2018-07-01 RX ADMIN — ATORVASTATIN CALCIUM 80 MILLIGRAM(S): 80 TABLET, FILM COATED ORAL at 21:29

## 2018-07-01 RX ADMIN — MEROPENEM 100 MILLIGRAM(S): 1 INJECTION INTRAVENOUS at 13:43

## 2018-07-01 RX ADMIN — Medication 100 MILLIGRAM(S): at 05:06

## 2018-07-01 RX ADMIN — MIRTAZAPINE 7.5 MILLIGRAM(S): 45 TABLET, ORALLY DISINTEGRATING ORAL at 21:29

## 2018-07-01 RX ADMIN — HEPARIN SODIUM 5000 UNIT(S): 5000 INJECTION INTRAVENOUS; SUBCUTANEOUS at 21:31

## 2018-07-01 RX ADMIN — SENNA PLUS 5 MILLILITER(S): 8.6 TABLET ORAL at 18:19

## 2018-07-01 RX ADMIN — Medication 250 MILLIGRAM(S): at 05:05

## 2018-07-01 RX ADMIN — SENNA PLUS 5 MILLILITER(S): 8.6 TABLET ORAL at 05:05

## 2018-07-01 RX ADMIN — PANTOPRAZOLE SODIUM 40 MILLIGRAM(S): 20 TABLET, DELAYED RELEASE ORAL at 06:09

## 2018-07-01 NOTE — PROGRESS NOTE ADULT - ATTENDING COMMENTS
continue present rx///repeat blood cults/////assess re weaning    6/27: f/u labs , cont abx, rpt cxr/ blood cx  6/28: f/u blood cx , off vent 4-6 hours , cxr no change    66/30 continue weaning 24 hrs speech / swallow  7/1  no chg cxr// wean as tolerated//recall speech

## 2018-07-01 NOTE — PROGRESS NOTE ADULT - SUBJECTIVE AND OBJECTIVE BOX
Patient is a 53y old  Male who presents with a chief complaint of desaturation to 88% and respiratory distress (24 Jun 2018 08:12)  SEPTIC SHOCK ACUTE RESPIRATORY FAILURE WITH HYPOXIA AND HYPERCAPNIA PNEUMO  ^UNRESP  No pertinent family history in first degree relatives  MEWS Score  Deep vein thrombosis (DVT) of popliteal vein of right lower extremity, unspecified chronicity  PTSD (post-traumatic stress disorder)  Neurogenic bladder  Respiratory failure  Spinal cord injury, cervical region  Septic shock  H/O tracheostomy  PEG (percutaneous endoscopic gastrostomy) status  UNRESP  Pneumonia of right lung due to infectious organism, unspecified part of lung  Acute respiratory failure with hypoxia and hypercapnia    SEPTIC SHOCK ACUTE RESPIRATORY FAILURE WITH HYPOXIA AND HYPERCAPNIA PNEUMO  ^UNRESP  No pertinent family history in first degree relatives  MEWS Score  Deep vein thrombosis (DVT) of popliteal vein of right lower extremity, unspecified chronicity  PTSD (post-traumatic stress disorder)  Neurogenic bladder  Respiratory failure  Spinal cord injury, cervical region  Septic shock  H/O tracheostomy  PEG (percutaneous endoscopic gastrostomy) status  UNRESP  Pneumonia of right lung due to infectious organism, unspecified part of lung  Acute respiratory failure with hypoxia and hypercapnia  SEPTIC SHOCK ACUTE RESPIRATORY FAILURE WITH HYPOXIA AND HYPERCAPNIA PNEUMO [Active]  Deep vein thrombosis (DVT) of popliteal vein of right lower extremity, unspecified chronicity [Active]  PTSD (post-traumatic stress disorder) [Active]  Neurogenic bladder [Active]  Respiratory failure [Active]  Spinal cord injury, cervical region [Active]  Septic shock [Active]  H/O tracheostomy [Active]  PEG (percutaneous endoscopic gastrostomy) status [Active]  Pneumonia of right lung due to infectious organism, unspecified part of lung [Active]  Acute respiratory failure with hypoxia and hypercapnia [Active]          HOSPITAL DAY NO: 8d      Vital Signs Last 24 Hrs  T(C): 37.1 (01 Jul 2018 07:30), Max: 37.1 (01 Jul 2018 07:30)  T(F): 98.7 (01 Jul 2018 07:30), Max: 98.7 (01 Jul 2018 07:30)  HR: 94 (01 Jul 2018 07:30) (88 - 115)  BP: 111/66 (01 Jul 2018 07:30) (111/66 - 127/70)  BP(mean): 82 (01 Jul 2018 07:30) (82 - 82)  RR: 18 (30 Jun 2018 23:40) (18 - 18)  SpO2: 96% (01 Jul 2018 00:49) (96% - 100%)    use of pressors: Y ___  Nx ____  x  use of sedation: Y ___  N x____    Vent dependent: Y_x___  N ____    Mode: AC/ CMV (Assist Control/ Continuous Mandatory Ventilation)  RR (machine): 20  TV (machine): 450  FiO2: 40  PEEP: 5  ITime: 1  MAP: 13  PIP: 25           Mode: AC/ CMV (Assist Control/ Continuous Mandatory Ventilation)  RR (machine): 20  TV (machine): 450  FiO2: 40  PEEP: 5  ITime: 1  MAP: 13  PIP: 25                                 Weaning time: 14.5    Significant exam findings:   MS:a/a  CHEST: B/L breath sounds   ABDOMEN: soft   HEART:S1/S2 regular  SKIN: ok    No of BMs: 0      Nutrition: peg                   06-30-18 @ 07:01  -  07-01-18 @ 07:00  --------------------------------------------------------  IN:1370  out 2300    Osmolite: 720 mL  Total IN: 720 mL          LABS:                          11.3   6.12  )-----------( 468      ( 01 Jul 2018 05:41 )             35.3                                               07-01    135  |  94<L>  |  13  ----------------------------<  119<H>  5.1<H>   |  29  |  <0.5<L>    Ca    8.7      01 Jul 2018 05:41  Mg     2.0     07-01                                                                                                                                            MEDICATIONS  (STANDING):  aspirin enteric coated 81 milliGRAM(s) Oral daily  atorvastatin 80 milliGRAM(s) Oral at bedtime  docusate sodium Liquid 100 milliGRAM(s) Oral two times a day  heparin  Injectable 5000 Unit(s) SubCutaneous every 8 hours  melatonin 5 milliGRAM(s) Oral at bedtime  meropenem  IVPB      meropenem  IVPB 1000 milliGRAM(s) IV Intermittent every 8 hours  mirtazapine 7.5 milliGRAM(s) Oral at bedtime  pantoprazole   Suspension 40 milliGRAM(s) Oral before breakfast  senna Syrup 5 milliLiter(s) Oral two times a day  sodium chloride 0.9% Bolus 1000 milliLiter(s) IV Bolus once  vancomycin  IVPB 1000 milliGRAM(s) IV Intermittent every 12 hours    MEDICATIONS  (PRN):  acetaminophen   Tablet 650 milliGRAM(s) Oral every 6 hours PRN For Temp greater than 38 C (100.4 F)          Case discussed with staff and family at the bedside

## 2018-07-02 RX ADMIN — Medication 81 MILLIGRAM(S): at 12:07

## 2018-07-02 RX ADMIN — MEROPENEM 100 MILLIGRAM(S): 1 INJECTION INTRAVENOUS at 05:41

## 2018-07-02 RX ADMIN — Medication 5 MILLIGRAM(S): at 22:42

## 2018-07-02 RX ADMIN — SENNA PLUS 5 MILLILITER(S): 8.6 TABLET ORAL at 17:57

## 2018-07-02 RX ADMIN — ATORVASTATIN CALCIUM 80 MILLIGRAM(S): 80 TABLET, FILM COATED ORAL at 22:42

## 2018-07-02 RX ADMIN — Medication 100 MILLIGRAM(S): at 05:34

## 2018-07-02 RX ADMIN — HEPARIN SODIUM 5000 UNIT(S): 5000 INJECTION INTRAVENOUS; SUBCUTANEOUS at 05:35

## 2018-07-02 RX ADMIN — SENNA PLUS 5 MILLILITER(S): 8.6 TABLET ORAL at 06:12

## 2018-07-02 RX ADMIN — MIRTAZAPINE 7.5 MILLIGRAM(S): 45 TABLET, ORALLY DISINTEGRATING ORAL at 22:43

## 2018-07-02 RX ADMIN — HEPARIN SODIUM 5000 UNIT(S): 5000 INJECTION INTRAVENOUS; SUBCUTANEOUS at 14:14

## 2018-07-02 RX ADMIN — Medication 100 MILLIGRAM(S): at 17:59

## 2018-07-02 RX ADMIN — Medication 250 MILLIGRAM(S): at 05:41

## 2018-07-02 RX ADMIN — HEPARIN SODIUM 5000 UNIT(S): 5000 INJECTION INTRAVENOUS; SUBCUTANEOUS at 22:42

## 2018-07-02 RX ADMIN — PANTOPRAZOLE SODIUM 40 MILLIGRAM(S): 20 TABLET, DELAYED RELEASE ORAL at 06:12

## 2018-07-02 NOTE — CHART NOTE - NSCHARTNOTEFT_GEN_A_CORE
Registered Dietitian At Risk Follow-Up     Patient Profile Reviewed                           Yes [x]   No []     Nutrition History Previously Obtained        Yes [x]  No []       Pertinent Subjective Information: Spoke to RN who reports that pt is tolerating current TF regimen well with no issues. Pt hasn't had a bowel movement in 4 days according to RN, and are planning to give enema.     Pertinent Medical Interventions: Plan to d/c abx and wean off fio2; humidified oxygen to loosen secretions, suction as needed.      Diet order: Osmolite 1.5 240ml q 4hrs (2160kcal, 90gm pro, 1097ml free water)     Anthropometrics:  - Ht. 75"  - Current Wt. 146#//66kg  Wt Hx.  168#/76kg (adm wt)   159#/72kg (6/26)  147#/68kg (6/30):     Pertinent Lab Data: Reviewed (7/1): K+ 5.1, Cr. <0.5, Glu 119      Pertinent Meds: heparin, lipitor, colace, protonix, senna, remeron     Physical Findings:  - Appearance: trached/off vent; sleeping upon RD visit  - GI function: LBM 6/28   - Tubes: PEG in place   - Oral/Mouth cavity: per SLP reccs on 6/29--continue peg as primary means of nutrition/hydration. pt can have pleasure feeds of puree and nectar thick liquids  - Skin: Abel 11     Nutrition Requirements  Weight Used: 168#/76kg     Estimated Calorie Needs: 0776-6220 kcal/day (MSJ x 1.2-1.3 AF)--remains as of 6/25     Estimated Protein Needs: 76-91 gm/day (1-1.2 gmkg CBW)--remains as of 6/25     Estimated Fluid Needs: per vent team    Nutrient Intake: meeting kcal/pro needs       Previous Nutrition Diagnosis: Less than optimal enteral nutrition composition or modality (resolved)          Nutrition Intervention: enteral nutrition     Recommendations:    Goal/Expected Outcome: Pt to receive and tolerate >85%, but not exceed >105% of estimated nutrient needs within 7 days     Indicator/Monitoring: RD to monitor diet order, energy intake, body comp, nutrition focused physical findings. Registered Dietitian At Risk Follow-Up     Patient Profile Reviewed                           Yes [x]   No []     Nutrition History Previously Obtained        Yes [x]  No []       Pertinent Subjective Information: Spoke to RN who reports that pt is tolerating current TF regimen well with no issues. Pt hasn't had a bowel movement in 4 days according to RN, and are planning to give enema. Per PA, they are holding off on giving pt solids for fear of aspiration.     Pertinent Medical Interventions: Plan to d/c abx and wean off fio2; humidified oxygen to loosen secretions, suction as needed.      Diet order: Osmolite 1.5 240ml q 4hrs (2160kcal, 90gm pro, 1097ml free water)     Anthropometrics:  - Ht. 75"  - Current Wt. 146#//66kg  Wt Hx.  168#/76kg (adm wt)   159#/72kg (6/26)  147#/68kg (6/30):     Pertinent Lab Data: Reviewed (7/1): K+ 5.1, Cr. <0.5, Glu 119      Pertinent Meds: heparin, lipitor, colace, protonix, senna, remeron     Physical Findings:  - Appearance: trached/off vent; sleeping upon RD visit  - GI function: LBM 6/28   - Tubes: PEG in place   - Oral/Mouth cavity: per SLP reccs on 6/29--continue peg as primary means of nutrition/hydration. pt can have pleasure feeds of puree and nectar thick liquids  - Skin: Abel 11     Nutrition Requirements  Weight Used: 168#/76kg     Estimated Calorie Needs: 3597-1741 kcal/day (MSJ x 1.2-1.3 AF)--remains as of 6/25     Estimated Protein Needs: 76-91 gm/day (1-1.2 gmkg CBW)--remains as of 6/25     Estimated Fluid Needs: per vent team    Nutrient Intake: meeting kcal/pro needs       Previous Nutrition Diagnosis: Less than optimal enteral nutrition composition or modality (resolved)          Nutrition Intervention: enteral nutrition     Recommendations:    Goal/Expected Outcome: Pt to receive and tolerate >85%, but not exceed >105% of estimated nutrient needs within 7 days     Indicator/Monitoring: RD to monitor diet order, energy intake, body comp, nutrition focused physical findings. Registered Dietitian At Risk Follow-Up     Patient Profile Reviewed                           Yes [x]   No []     Nutrition History Previously Obtained        Yes [x]  No []       Pertinent Subjective Information: Spoke to RN who reports that pt is tolerating current TF regimen well with no issues. Pt hasn't had a bowel movement in 4 days according to RN, and are planning to give enema. Per PA, they are holding off on giving pt solids for fear of aspiration currently.     Pertinent Medical Interventions: Plan to d/c abx and wean off fio2; humidified oxygen to loosen secretions, suction as needed.      Diet order: Osmolite 1.5 240ml q 4hrs (2160kcal, 90gm pro, 1097ml free water)     Anthropometrics:  - Ht. 75"  - Current Wt. 146#//66kg  Wt Hx.  168#/76kg (adm wt)   159#/72kg (6/26)  147#/68kg (6/30):     Pertinent Lab Data: Reviewed (7/1): K+ 5.1, Cr. <0.5, Glu 119      Pertinent Meds: heparin, lipitor, colace, protonix, senna, remeron     Physical Findings:  - Appearance: trached/off vent; sleeping upon RD visit  - GI function: LBM 6/28   - Tubes: PEG in place   - Oral/Mouth cavity: per SLP reccs on 6/29--continue peg as primary means of nutrition/hydration. pt can have pleasure feeds of puree and nectar thick liquids  - Skin: Abel 11     Nutrition Requirements  Weight Used: 168#/76kg     Estimated Calorie Needs: 6021-8205 kcal/day (MSJ x 1.2-1.3 AF)--remains as of 6/25     Estimated Protein Needs: 76-91 gm/day (1-1.2 gmkg CBW)--remains as of 6/25     Estimated Fluid Needs: per vent team    Nutrient Intake: meeting kcal/pro needs with current TF regimen      Previous Nutrition Diagnosis: Less than optimal enteral nutrition composition or modality (resolved)--will monitor further po trials          Nutrition Intervention: enteral nutrition     Recommendations:  1. Continue current TF regimen    Goal/Expected Outcome: Pt to receive and tolerate >85%, but not exceed >105% of estimated nutrient needs within 7 days     Indicator/Monitoring: RD to monitor diet order, energy intake, body comp, nutrition focused physical findings.

## 2018-07-02 NOTE — PROGRESS NOTE ADULT - SUBJECTIVE AND OBJECTIVE BOX
LEXY SANTAMARIA  53y  Male    Patient is a 53y old  Male who presents with a chief complaint of desaturation to 88% and respiratory distress (24 Jun 2018 08:12)      SUBJECTIVE/OVERNIGHT EVENTS:    none    PAST MEDICAL & SURGICAL HISTORY:  Deep vein thrombosis (DVT) of popliteal vein of right lower extremity, unspecified chronicity  PTSD (post-traumatic stress disorder)  Neurogenic bladder  Respiratory failure  Spinal cord injury, cervical region  H/O tracheostomy  PEG (percutaneous endoscopic gastrostomy) status    MEDICATIONS  (STANDING):  aspirin enteric coated 81 milliGRAM(s) Oral daily  atorvastatin 80 milliGRAM(s) Oral at bedtime  docusate sodium Liquid 100 milliGRAM(s) Oral two times a day  heparin  Injectable 5000 Unit(s) SubCutaneous every 8 hours  melatonin 5 milliGRAM(s) Oral at bedtime  meropenem  IVPB      meropenem  IVPB 1000 milliGRAM(s) IV Intermittent every 8 hours  mirtazapine 7.5 milliGRAM(s) Oral at bedtime  pantoprazole   Suspension 40 milliGRAM(s) Oral before breakfast  senna Syrup 5 milliLiter(s) Oral two times a day  sodium chloride 0.9% Bolus 1000 milliLiter(s) IV Bolus once  vancomycin  IVPB 1000 milliGRAM(s) IV Intermittent every 12 hours    MEDICATIONS  (PRN):  acetaminophen   Tablet 650 milliGRAM(s) Oral every 6 hours PRN For Temp greater than 38 C (100.4 F)      OBJECTIVE:    Vital Signs Last 24 Hrs  T(C): 37.2 (01 Jul 2018 23:28), Max: 37.8 (01 Jul 2018 15:30)  T(F): 99 (01 Jul 2018 23:28), Max: 100 (01 Jul 2018 15:30)  HR: 105 (02 Jul 2018 00:55) (90 - 129)  BP: 86/50 (01 Jul 2018 23:28) (86/50 - 138/77)  BP(mean): 90 (01 Jul 2018 15:30) (82 - 90)  RR: 20 (01 Jul 2018 23:28) (20 - 20)  SpO2: 100% (02 Jul 2018 00:55) (96% - 100%)    General: In NAD  HEENT: + trach               Neck: Supple.   No Cervical LN    Lungs: Bilateral BS  Cardiovascular: Regular   Abdomen: Soft. + BS  Extremities: No CLubbing   Skin:   Neurological: non Focal         LABS:                          11.3   6.12  )-----------( 468      ( 01 Jul 2018 05:41 )             35.3                                               07-01    135  |  94<L>  |  13  ----------------------------<  119<H>  5.1<H>   |  29  |  <0.5<L>    Ca    8.7      01 Jul 2018 05:41  Mg     2.0     07-01                                                                                                                                                                                   Mode: AC/ CMV (Assist Control/ Continuous Mandatory Ventilation)  RR (machine): 20  TV (machine): 450  FiO2: 40  PEEP: 5  ITime: 1  MAP: 11  PIP: 21 LEXY SANTAMARIA  53y  Male    Patient is a 53y old  Male who presents with a chief complaint of desaturation to 88% and respiratory distress (24 Jun 2018 08:12)      SUBJECTIVE/OVERNIGHT EVENTS:    none    PAST MEDICAL & SURGICAL HISTORY:  Deep vein thrombosis (DVT) of popliteal vein of right lower extremity, unspecified chronicity  PTSD (post-traumatic stress disorder)  Neurogenic bladder  Respiratory failure  Spinal cord injury, cervical region  H/O tracheostomy  PEG (percutaneous endoscopic gastrostomy) status    MEDICATIONS  (STANDING):  aspirin enteric coated 81 milliGRAM(s) Oral daily  atorvastatin 80 milliGRAM(s) Oral at bedtime  docusate sodium Liquid 100 milliGRAM(s) Oral two times a day  heparin  Injectable 5000 Unit(s) SubCutaneous every 8 hours  melatonin 5 milliGRAM(s) Oral at bedtime  meropenem  IVPB      meropenem  IVPB 1000 milliGRAM(s) IV Intermittent every 8 hours  mirtazapine 7.5 milliGRAM(s) Oral at bedtime  pantoprazole   Suspension 40 milliGRAM(s) Oral before breakfast  senna Syrup 5 milliLiter(s) Oral two times a day  sodium chloride 0.9% Bolus 1000 milliLiter(s) IV Bolus once  vancomycin  IVPB 1000 milliGRAM(s) IV Intermittent every 12 hours    MEDICATIONS  (PRN):  acetaminophen   Tablet 650 milliGRAM(s) Oral every 6 hours PRN For Temp greater than 38 C (100.4 F)      OBJECTIVE:    Vital Signs Last 24 Hrs  T(C): 37.2 (01 Jul 2018 23:28), Max: 37.8 (01 Jul 2018 15:30)  T(F): 99 (01 Jul 2018 23:28), Max: 100 (01 Jul 2018 15:30)  HR: 105 (02 Jul 2018 00:55) (90 - 129)  BP: 86/50 (01 Jul 2018 23:28) (86/50 - 138/77)  BP(mean): 90 (01 Jul 2018 15:30) (82 - 90)  RR: 20 (01 Jul 2018 23:28) (20 - 20)  SpO2: 100% (02 Jul 2018 00:55) (96% - 100%)    General: In NAD  HEENT: + trach               Neck: Supple.   No Cervical LN    Lungs: Bilateral BS  Cardiovascular: Regular   Abdomen: Soft. + BS  Extremities: No CLubbing   Skin:   Neurological: non Focal         LABS:                          11.3   6.12  )-----------( 468      ( 01 Jul 2018 05:41 )             35.3                                               07-01    135  |  94<L>  |  13  ----------------------------<  119<H>  5.1<H>   |  29  |  <0.5<L>    Ca    8.7      01 Jul 2018 05:41  Mg     2.0     07-01                                                 VENT - NO  PRESSORS - NO  SEDATION - NO                                                                                                                         Mode: AC/ CMV (Assist Control/ Continuous Mandatory Ventilation)  RR (machine): 20  TV (machine): 450  FiO2: 40  PEEP: 5  ITime: 1  MAP: 11  PIP: 21 LEXY SANTAMARIA  53y  Male    Patient is a 53y old  Male who presents with a chief complaint of desaturation to 88% and respiratory distress (24 Jun 2018 08:12)      SUBJECTIVE/OVERNIGHT EVENTS:    none    PAST MEDICAL & SURGICAL HISTORY:  Deep vein thrombosis (DVT) of popliteal vein of right lower extremity, unspecified chronicity  PTSD (post-traumatic stress disorder)  Neurogenic bladder  Respiratory failure  Spinal cord injury, cervical region  H/O tracheostomy  PEG (percutaneous endoscopic gastrostomy) status    MEDICATIONS  (STANDING):  aspirin enteric coated 81 milliGRAM(s) Oral daily  atorvastatin 80 milliGRAM(s) Oral at bedtime  docusate sodium Liquid 100 milliGRAM(s) Oral two times a day  heparin  Injectable 5000 Unit(s) SubCutaneous every 8 hours  melatonin 5 milliGRAM(s) Oral at bedtime  meropenem  IVPB      meropenem  IVPB 1000 milliGRAM(s) IV Intermittent every 8 hours  mirtazapine 7.5 milliGRAM(s) Oral at bedtime  pantoprazole   Suspension 40 milliGRAM(s) Oral before breakfast  senna Syrup 5 milliLiter(s) Oral two times a day  sodium chloride 0.9% Bolus 1000 milliLiter(s) IV Bolus once  vancomycin  IVPB 1000 milliGRAM(s) IV Intermittent every 12 hours    MEDICATIONS  (PRN):  acetaminophen   Tablet 650 milliGRAM(s) Oral every 6 hours PRN For Temp greater than 38 C (100.4 F)      OBJECTIVE:    Vital Signs Last 24 Hrs  T(C): 37.2 (01 Jul 2018 23:28), Max: 37.8 (01 Jul 2018 15:30)  T(F): 99 (01 Jul 2018 23:28), Max: 100 (01 Jul 2018 15:30)  HR: 105 (02 Jul 2018 00:55) (90 - 129)  BP: 86/50 (01 Jul 2018 23:28) (86/50 - 138/77)  BP(mean): 90 (01 Jul 2018 15:30) (82 - 90)  RR: 20 (01 Jul 2018 23:28) (20 - 20)  SpO2: 100% (02 Jul 2018 00:55) (96% - 100%)    General: In NAD  HEENT: + trach               Neck: Supple.   No Cervical LN    Lungs: Bilateral BS  Cardiovascular: Regular   Abdomen: Soft. + BS  Extremities: No CLubbing   Skin: intact  Neurological: non Focal         LABS:                          11.3   6.12  )-----------( 468      ( 01 Jul 2018 05:41 )             35.3                                               07-01    135  |  94<L>  |  13  ----------------------------<  119<H>  5.1<H>   |  29  |  <0.5<L>    Ca    8.7      01 Jul 2018 05:41  Mg     2.0     07-01                                                 VENT - NO  PRESSORS - NO  SEDATION - NO                                                                                                                         Mode: AC/ CMV (Assist Control/ Continuous Mandatory Ventilation)  RR (machine): 20  TV (machine): 450  FiO2: 40  PEEP: 5  ITime: 1  MAP: 11  PIP: 21

## 2018-07-02 NOTE — PROGRESS NOTE ADULT - ASSESSMENT
IMPRESSION    AHRF  Septic Shock resolved  HCAP  H/O quadraplegia s/p trach and peg    PLAN:    - d/c abx [s/p 9 days]  - weaning trial  - speech and swallow  - repeat duplex negative prelim IMPRESSION    AHRF resolved, off vent  Septic Shock resolved  HCAP  H/O quadraplegia s/p trach and peg    PLAN:    - d/c abx [s/p 9 days]  - trach collar, wean fio2  - humidifed oxygen to loosen secretions, suction as needed  - speech and swallow  - repeat duplex negative prelim

## 2018-07-03 LAB
CULTURE RESULTS: SIGNIFICANT CHANGE UP
SPECIMEN SOURCE: SIGNIFICANT CHANGE UP

## 2018-07-03 RX ADMIN — ATORVASTATIN CALCIUM 80 MILLIGRAM(S): 80 TABLET, FILM COATED ORAL at 21:06

## 2018-07-03 RX ADMIN — SENNA PLUS 5 MILLILITER(S): 8.6 TABLET ORAL at 05:22

## 2018-07-03 RX ADMIN — Medication 100 MILLIGRAM(S): at 17:28

## 2018-07-03 RX ADMIN — Medication 100 MILLIGRAM(S): at 05:22

## 2018-07-03 RX ADMIN — HEPARIN SODIUM 5000 UNIT(S): 5000 INJECTION INTRAVENOUS; SUBCUTANEOUS at 05:23

## 2018-07-03 RX ADMIN — HEPARIN SODIUM 5000 UNIT(S): 5000 INJECTION INTRAVENOUS; SUBCUTANEOUS at 13:57

## 2018-07-03 RX ADMIN — PANTOPRAZOLE SODIUM 40 MILLIGRAM(S): 20 TABLET, DELAYED RELEASE ORAL at 06:06

## 2018-07-03 RX ADMIN — Medication 5 MILLIGRAM(S): at 21:06

## 2018-07-03 RX ADMIN — HEPARIN SODIUM 5000 UNIT(S): 5000 INJECTION INTRAVENOUS; SUBCUTANEOUS at 21:05

## 2018-07-03 RX ADMIN — Medication 81 MILLIGRAM(S): at 11:52

## 2018-07-03 RX ADMIN — MIRTAZAPINE 7.5 MILLIGRAM(S): 45 TABLET, ORALLY DISINTEGRATING ORAL at 21:06

## 2018-07-03 RX ADMIN — SENNA PLUS 5 MILLILITER(S): 8.6 TABLET ORAL at 17:28

## 2018-07-03 NOTE — PROGRESS NOTE ADULT - ASSESSMENT
IMPRESSION    AHRF resolved, off vent  Septic Shock resolved  HCAP  H/O quadraplegia s/p trach and peg    PLAN:    - d/c abx [s/p 9 days]  - trach collar, wean fio2  - humidifed oxygen to loosen secretions, suction as needed  - speech and swallow  - repeat duplex negative prelim

## 2018-07-03 NOTE — PROGRESS NOTE ADULT - SUBJECTIVE AND OBJECTIVE BOX
Patient is a 53y old  Male who presents with a chief complaint of desaturation to 88% and respiratory distress (24 Jun 2018 08:12)      SEPTIC SHOCK ACUTE RESPIRATORY FAILURE WITH HYPOXIA AND HYPERCAPNIA PNEUMO  ^UNRESP  No pertinent family history in first degree relatives  MEWS Score  Deep vein thrombosis (DVT) of popliteal vein of right lower extremity, unspecified chronicity  PTSD (post-traumatic stress disorder)  Neurogenic bladder  Respiratory failure  Spinal cord injury, cervical region  Septic shock  H/O tracheostomy  PEG (percutaneous endoscopic gastrostomy) status  UNRESP  Pneumonia of right lung due to infectious organism, unspecified part of lung  Acute respiratory failure with hypoxia and hypercapnia  SEPTIC SHOCK ACUTE RESPIRATORY FAILURE WITH HYPOXIA AND HYPERCAPNIA PNEUMO [Active]  Deep vein thrombosis (DVT) of popliteal vein of right lower extremity, unspecified chronicity [Active]  PTSD (post-traumatic stress disorder) [Active]  Neurogenic bladder [Active]  Respiratory failure [Active]  Spinal cord injury, cervical region [Active]  Septic shock [Active]  H/O tracheostomy [Active]  PEG (percutaneous endoscopic gastrostomy) status [Active]  Pneumonia of right lung due to infectious organism, unspecified part of lung [Active]  Acute respiratory failure with hypoxia and hypercapnia [Active]          HOSPITAL DAY NO: 10d      Vital Signs Last 24 Hrs  T(C): 37.2 (03 Jul 2018 08:06), Max: 38 (03 Jul 2018 00:24)  T(F): 99 (03 Jul 2018 08:06), Max: 100.4 (03 Jul 2018 00:24)  HR: 96 (03 Jul 2018 08:06) (88 - 107)  BP: 100/55 (03 Jul 2018 08:06) (100/55 - 117/80)  BP(mean): 73 (03 Jul 2018 08:06) (73 - 92)  RR: 21 (03 Jul 2018 08:06) (21 - 28)  SpO2: 100% (03 Jul 2018 00:24) (98% - 100%)    use of pressors: Y ___  N ____x    use of sedation: Y ___  N ____x    Vent dependent: Y___x_  N ____    Mode: AC/ CMV (Assist Control/ Continuous Mandatory Ventilation)  RR (machine): 20  TV (machine): 450  FiO2: 40  PEEP: 5  ITime: 1  MAP: 11  PIP: 22           Mode: AC/ CMV (Assist Control/ Continuous Mandatory Ventilation)  RR (machine): 20  TV (machine): 450  FiO2: 40  PEEP: 5  ITime: 1  MAP: 11  PIP: 22                                 Weaning time:     Significant exam findings:   MS: ax o 3  CHEST: B/L breath sounds   ABDOMEN: soft   HEART:S1/S2 regular  SKIN: intact    No of BMs: yesterday - disimpaction / enema    Nutrition:                    07-02-18 @ 07:01  -  07-03-18 @ 07:00  --------------------------------------------------------  IN:    Osmolite: 1440 mL  Total IN: 1440 mL          LABS:  < from: Xray Chest 1 View AP/PA (07.02.18 @ 12:49) >  Impression:      Stable right pleural effusion/opacity.    < end of copied text >                                                                                                                                                                                            MEDICATIONS  (STANDING):  aspirin enteric coated 81 milliGRAM(s) Oral daily  atorvastatin 80 milliGRAM(s) Oral at bedtime  docusate sodium Liquid 100 milliGRAM(s) Oral two times a day  heparin  Injectable 5000 Unit(s) SubCutaneous every 8 hours  melatonin 5 milliGRAM(s) Oral at bedtime  mirtazapine 7.5 milliGRAM(s) Oral at bedtime  pantoprazole   Suspension 40 milliGRAM(s) Oral before breakfast  senna Syrup 5 milliLiter(s) Oral two times a day  sodium chloride 0.9% Bolus 1000 milliLiter(s) IV Bolus once    MEDICATIONS  (PRN):  acetaminophen   Tablet 650 milliGRAM(s) Oral every 6 hours PRN For Temp greater than 38 C (100.4 F)          Case discussed with staff and family at the bedside

## 2018-07-04 RX ADMIN — MIRTAZAPINE 7.5 MILLIGRAM(S): 45 TABLET, ORALLY DISINTEGRATING ORAL at 21:13

## 2018-07-04 RX ADMIN — PANTOPRAZOLE SODIUM 40 MILLIGRAM(S): 20 TABLET, DELAYED RELEASE ORAL at 06:08

## 2018-07-04 RX ADMIN — Medication 100 MILLIGRAM(S): at 18:21

## 2018-07-04 RX ADMIN — Medication 5 MILLIGRAM(S): at 21:13

## 2018-07-04 RX ADMIN — HEPARIN SODIUM 5000 UNIT(S): 5000 INJECTION INTRAVENOUS; SUBCUTANEOUS at 21:13

## 2018-07-04 RX ADMIN — Medication 81 MILLIGRAM(S): at 12:21

## 2018-07-04 RX ADMIN — HEPARIN SODIUM 5000 UNIT(S): 5000 INJECTION INTRAVENOUS; SUBCUTANEOUS at 05:36

## 2018-07-04 RX ADMIN — SENNA PLUS 5 MILLILITER(S): 8.6 TABLET ORAL at 05:36

## 2018-07-04 RX ADMIN — Medication 100 MILLIGRAM(S): at 05:36

## 2018-07-04 RX ADMIN — HEPARIN SODIUM 5000 UNIT(S): 5000 INJECTION INTRAVENOUS; SUBCUTANEOUS at 18:21

## 2018-07-04 RX ADMIN — ATORVASTATIN CALCIUM 80 MILLIGRAM(S): 80 TABLET, FILM COATED ORAL at 21:13

## 2018-07-04 RX ADMIN — SENNA PLUS 5 MILLILITER(S): 8.6 TABLET ORAL at 18:21

## 2018-07-04 NOTE — CONSULT NOTE ADULT - SUBJECTIVE AND OBJECTIVE BOX
ENT DAILY PROGRESS NOTE    Overnight events/Interval HPI: HPI:  54 yo M with PMHx of acute spinal injury with quadraplegia s/p trach and peg (7/27/2017), recurrent pneumonia, ESBL E.Coli Bacteremia,  Klebsiella UTI, neurogenic bladder, PTSD, right popliteal DVT presented from [facility] for episode of desaturation and respiratory failure occurring on morning of presentation. Despite aspiration from trach, patient remained hypoxic, in the ED patient had his cuffless trach substituted for size 6 cuffed shiley. Patient was also found to be hypotensive to 58/36, CXR revealed near opacification of the right lung. Patient was placed on ventilator and started on Levophed.  As per patient, he endorses shortness of breath, denies chest pain, palpitations, fevers, chills, nausea, vomiting. (24 Jun 2018 08:12)      called to evaluate for air leak. Pt seen and examined at bedside. not in any acute distress, denies any trouble breathing/shortness of breath, chest pain. On vent occasionally will have an air leak due to positioning and tidal volume drops but it resolves in minutes. Pt sating well at 100%. minimal secretions, being suctioned prn           Allergies    No Known Drug Allergies  shellfish (Unknown)    Intolerances        MEDICATIONS:  Antiinfectives:     IV fluids:  sodium chloride 0.9% Bolus 1000 milliLiter(s) IV Bolus once    Hematologic/Anticoagulation:  aspirin enteric coated 81 milliGRAM(s) Oral daily  heparin  Injectable 5000 Unit(s) SubCutaneous every 8 hours    Pain medications/Neuro:  acetaminophen   Tablet 650 milliGRAM(s) Oral every 6 hours PRN  melatonin 5 milliGRAM(s) Oral at bedtime  mirtazapine 7.5 milliGRAM(s) Oral at bedtime    Endocrine Medications:   atorvastatin 80 milliGRAM(s) Oral at bedtime    All other standing medications:   docusate sodium Liquid 100 milliGRAM(s) Oral two times a day  pantoprazole   Suspension 40 milliGRAM(s) Oral before breakfast  senna Syrup 5 milliLiter(s) Oral two times a day    All other PRN medications:      Vital Signs Last 24 Hrs  T(C): 37.8 (04 Jul 2018 15:00), Max: 37.8 (04 Jul 2018 15:00)  T(F): 100.1 (04 Jul 2018 15:00), Max: 100.1 (04 Jul 2018 15:00)  HR: 127 (04 Jul 2018 15:47) (63 - 127)  BP: 124/68 (04 Jul 2018 15:00) (113/66 - 124/68)  BP(mean): 91 (04 Jul 2018 15:00) (81 - 91)  RR: 20 (04 Jul 2018 07:47) (20 - 20)  SpO2: 97% (04 Jul 2018 15:47) (96% - 100%)      07-03 @ 07:01  -  07-04 @ 07:00  --------------------------------------------------------  IN:    Enteral Tube Flush: 500 mL    Osmolite: 1440 mL  Total IN: 1940 mL    OUT:    Indwelling Catheter - Stomal: 200 mL    Indwelling Catheter - Urethral: 900 mL  Total OUT: 1100 mL    Total NET: 840 mL      07-04 @ 07:01  -  07-04 @ 21:34  --------------------------------------------------------  IN:    Enteral Tube Flush: 300 mL    Osmolite: 480 mL  Total IN: 780 mL    OUT:    Indwelling Catheter - Urethral: 200 mL  Total OUT: 200 mL    Total NET: 580 mL            PHYSICAL EXAM:    ENT EXAM-   Constitutional: pt on vent   , A&O x3  Head:  normocephalic, atraumatic.   Neck: + 6 shiley cuffed in place, on vent  No lymphadenopathy      LABS:  CBC-    BMP/CMP-        Coagulation Studies-    Endocrine Panel-              RADIOLOGY & ADDITIONAL STUDIES:
Patient is a 53y old  Male who presents with a chief complaint of desaturation to 88% and respiratory distress (24 Jun 2018 08:12)      HPI:  52 yo M with PMHx of acute spinal injury with paraplegia s/p trach and peg (7/27/2017), recurrent pneumonia, ESBL E.Coli Bacteremia,  Klebsiella UTI, neurogenic bladder, PTSD, right popliteal DVT presented from [facility] for episode of desaturation and respiratory failure occurring on morning of presentation. Despite aspiration from trach, patient remained hypoxic, in the ED patient had his cuffless trach substituted for size 6 cuffed shiley. Patient was also found to be hypotensive to 58/36, CXR revealed near opacification of the right lung. Patient was placed on ventilator and started on Levophed.  As per patient, he endorses shortness of breath, denies chest pain, palpitations, fevers, chills, nausea, vomiting. (24 Jun 2018 08:12), spiking T, ON LEVOPHED      PAST MEDICAL & SURGICAL HISTORY:  Deep vein thrombosis (DVT) of popliteal vein of right lower extremity, unspecified chronicity  PTSD (post-traumatic stress disorder)  Neurogenic bladder  Respiratory failure  Spinal cord injury, cervical region  H/O tracheostomy  PEG (percutaneous endoscopic gastrostomy) status      FAMILY HISTORY:  No pertinent family history in first degree relatives      REVIEW OF SYSTEMS    	    Allergies    No Known Drug Allergies  shellfish (Unknown)    Intolerances        acetaminophen   Tablet 650 milliGRAM(s) Oral every 6 hours PRN  atorvastatin 80 milliGRAM(s) Oral at bedtime  docusate sodium 100 milliGRAM(s) Oral three times a day  heparin  Injectable 5000 Unit(s) SubCutaneous every 8 hours  melatonin 5 milliGRAM(s) Oral at bedtime  meropenem  IVPB      meropenem  IVPB 1000 milliGRAM(s) IV Intermittent once  meropenem  IVPB 1000 milliGRAM(s) IV Intermittent every 8 hours  mirtazapine 7.5 milliGRAM(s) Oral at bedtime  norepinephrine Infusion 0.25 MICROgram(s)/kG/Min IV Continuous <Continuous>  pantoprazole   Suspension 40 milliGRAM(s) Oral before breakfast  senna 2 Tablet(s) Oral at bedtime  vancomycin  IVPB 1000 milliGRAM(s) IV Intermittent every 12 hours  : Home Meds:      PHYSICAL EXAM    ICU Vital Signs Last 24 Hrs  T(C): 38.8 (24 Jun 2018 07:42), Max: 38.8 (24 Jun 2018 07:42)  T(F): 101.8 (24 Jun 2018 07:42), Max: 101.8 (24 Jun 2018 07:42)  HR: 57 (24 Jun 2018 07:42) (57 - 104)  BP: 135/65 (24 Jun 2018 07:42) (58/36 - 144/78)  BP(mean): 86 (24 Jun 2018 07:42) (80 - 104)  ABP: 126/60 (23 Jun 2018 17:34) (121/54 - 126/60)  ABP(mean): --  RR: 24 (24 Jun 2018 07:42) (20 - 24)  SpO2: 97% (23 Jun 2018 23:18) (96% - 100%)      General:  HEENT:  APOLONIA              Lymph Nodes: No cervical LN   Lungs: R SIDE RHONCHI  Cardiovascular: Regular  Abdomen: Soft, Positive BS  Extremities: No clubbing  Skin: Warm  Neurological: Non focal       06-23-18 @ 07:01  -  06-24-18 @ 07:00  --------------------------------------------------------  IN:    IV PiggyBack: 350 mL    norepinephrine Infusion: 258 mL  Total IN: 608 mL    OUT:    Voided: 40 mL  Total OUT: 40 mL    Total NET: 568 mL          LABS:                          12.4   12.48 )-----------( 326      ( 23 Jun 2018 15:17 )             39.2                                               06-23    127<L>  |  84<L>  |  9<L>  ----------------------------<  112<H>  5.0   |  33<H>  |  0.5<L>    Ca    8.7      23 Jun 2018 15:17  Mg     2.0     06-23    TPro  6.2  /  Alb  3.5  /  TBili  <0.2  /  DBili  <0.2  /  AST  20  /  ALT  32  /  AlkPhos  90  06-23      PT/INR - ( 23 Jun 2018 15:17 )   PT: 13.40 sec;   INR: 1.24 ratio         PTT - ( 23 Jun 2018 15:17 )  PTT:36.5 sec                                           CARDIAC MARKERS ( 23 Jun 2018 15:17 )  x     / 0.03 ng/mL / 113 U/L / x     / x                                                LIVER FUNCTIONS - ( 23 Jun 2018 15:17 )  Alb: 3.5 g/dL / Pro: 6.2 g/dL / ALK PHOS: 90 U/L / ALT: 32 U/L / AST: 20 U/L / GGT: x                                                                                               Mode: AC/ CMV (Assist Control/ Continuous Mandatory Ventilation)  RR (machine): 450  TV (machine): 24  FiO2: 40  PEEP: 8  ITime: 1  MAP: 19  PIP: 36                                      ABG - ( 23 Jun 2018 17:44 )  pH, Arterial: 7.44  pH, Blood: x     /  pCO2: 44    /  pO2: 64    / HCO3: 30    / Base Excess: 5.1   /  SaO2: 95                  X-Rays  REVIEWED    MEDICATIONS  (STANDING):  atorvastatin 80 milliGRAM(s) Oral at bedtime  docusate sodium 100 milliGRAM(s) Oral three times a day  heparin  Injectable 5000 Unit(s) SubCutaneous every 8 hours  melatonin 5 milliGRAM(s) Oral at bedtime  meropenem  IVPB      meropenem  IVPB 1000 milliGRAM(s) IV Intermittent once  meropenem  IVPB 1000 milliGRAM(s) IV Intermittent every 8 hours  mirtazapine 7.5 milliGRAM(s) Oral at bedtime  norepinephrine Infusion 0.25 MICROgram(s)/kG/Min (25.781 mL/Hr) IV Continuous <Continuous>  pantoprazole   Suspension 40 milliGRAM(s) Oral before breakfast  senna 2 Tablet(s) Oral at bedtime  vancomycin  IVPB 1000 milliGRAM(s) IV Intermittent every 12 hours    MEDICATIONS  (PRN):  acetaminophen   Tablet 650 milliGRAM(s) Oral every 6 hours PRN For Temp greater than 38 C (100.4 F)
LEXY SANTAMARIA 53yMalePatient is a 53y old  Male who presents with a chief complaint of desaturation to 88% and respiratory distress (2018 08:12)      Patient has history of:  No Known Drug Allergies  shellfish (Unknown)      Adult/Nursing Home residence    SEPTIC SHOCK ACUTE RESPIRATORY FAILURE WITH HYPOXIA AND HYPERCAPNIA PNEUMO  ^SEPTIC SHOCK ACUTE RESPIRATORY FAILURE WITH HYPOXIA AND HYPERCAPNIA PNEUMO  No pertinent family history in first degree relatives  MEWS Score  Deep vein thrombosis (DVT) of popliteal vein of right lower extremity, unspecified chronicity  PTSD (post-traumatic stress disorder)  Neurogenic bladder  Respiratory failure  Spinal cord injury, cervical region  Septic shock  H/O tracheostomy  PEG (percutaneous endoscopic gastrostomy) status  UNRESP  Pneumonia of right lung due to infectious organism, unspecified part of lung  Acute respiratory failure with hypoxia and hypercapnia        Patient treated with:  meropenem  IVPB      meropenem  IVPB 1000 milliGRAM(s) IV Intermittent every 8 hours  vancomycin  IVPB 1000 milliGRAM(s) IV Intermittent every 12 hours        PHYSICAL EXAM  T(F): 98.6 (18 @ 07:30), Max: 103.7 (18 @ 00:27)  HR: 93 (18 @ 07:29) (93 - 112)  BP: 119/63 (18 @ 07:30) (98/55 - 119/63)  RR: 24 (18 @ 07:30) (20 - 31)  SpO2: 98% (18 @ 07:29) (98% - 100%)  Daily     Daily Weight in k.1 (2018 07:30)  HEENT: normal, no nuchal rigidity  Cor: RSR Nl S1 S2  Lungs: clear  Decreased breath sounds at bases    Abdomen: Nontender, Nl BS,     Ext: No clubbing,cyanosis or edema    LAB & RADIOLOGIC RESULTS:                        10.5   11.47 )-----------( 319      ( 2018 06:43 )             31.6             135  |  97<L>  |  8<L>  ----------------------------<  247<H>  4.0   |  28  |  <0.5<L>    Mg     1.9         TPro  6.2  /  Alb  3.5  /  TBili  <0.2  /  DBili  <0.2  /  AST  20  /  ALT  32  /  AlkPhos  90      Lactic Acidosis   Blood Gas Venous - Lactate: 3.4 mmoL/L (18 @ 15:29)  Lactate, Blood: 3.6 mmol/L (18 @ 15:17)    Acidosis    ABG - ( 2018 00:55 )  pH, Arterial: 7.51  pH, Blood: x     /  pCO2: 37    /  pO2: 125   / HCO3: 30    / Base Excess: 6.0   /  SaO2: 99                  Urinalysis Basic - ( 2018 08:03 )    Color: Yellow / Appearance: Turbid / SG: <=1.005 / pH: x  Gluc: x / Ketone: 15  / Bili: Negative / Urobili: 0.2 mg/dL   Blood: x / Protein: Trace mg/dL / Nitrite: Negative   Leuk Esterase: Large / RBC: 2-5 /HPF / WBC 10-25 /HPF   Sq Epi: x / Non Sq Epi: x / Bacteria: Moderate /HPF      PT/INR - ( 2018 15:17 )   PT: 13.40 sec;   INR: 1.24 ratio         PTT - ( 2018 15:17 )  PTT:36.5 sec    Culture - Blood (collected 2018 15:15)  Source: .Blood Blood  Gram Stain (2018 04:17):    Growth in aerobic bottle: Gram Positive Cocci in Clusters    Growth in anaerobic bottle:    Gram Positive Cocci in Clusters  Preliminary Report (2018 04:17):    Growth in aerobic bottle: Gram Positive Cocci in Clusters    ***Blood Panel PCR results on this specimen are available    approximately 3 hours after the Gram stain result.***    Gram stain, PCR, and/or culture results may not always    correspond due to difference in methodologies.    ************************************************************    This PCR assay was performed using [employer].    The following targets are tested for: Enterococcus,    vancomycin resistant enterococci, Listeria monocytogenes,    coagulase negative staphylococci, S. aureus,    methicillin resistant S. aureus, Streptococcus agalactiae    (Group B), S. pneumoniae, S. pyogenes (Group A),    Acinetobacter baumannii, Enterobacter cloacae, E. coli,    Klebsiella oxytoca, K. pneumoniae, Proteus sp.,    Serratia marcescens, Haemophilus influenzae,    Neisseria meningitidis, Pseudomonas aeruginosa, Candida    albicans, C. glabrata, C krusei, C parapsilosis,    C. tropicalis and the KPC resistance gene.    "Due to technical problems, Proteus sp. will Not be reported as part of    the BCID panel until further notice"    Growth in anaerobic bottle:    Gram Positive Cocci in Clusters  Organism: Blood Culture PCR (2018 20:07)  Organism: Blood Culture PCR (2018 20:07)      -  Coagulase negative Staphylococcus: Detec      Method Type: PCR

## 2018-07-04 NOTE — CONSULT NOTE ADULT - ASSESSMENT
IMPRESSION  Severe Sepsis (temp>101F, temp<96.8F, pulse>90 beats/min , resp rate>20/min, wbc>12, systolic hypotension, decreased systolic bp, lactic acidosis) due to suspected Gram negative pneumonia (from SN[facility] on Vent)    Cxray with right perihilar opacity & moderate right effusion    Hx neurogenic bladder, Trach, cervical spinal cord injury    6/23 Blood culture x1 Coagulase negative Staphylococcus (probable contaminat)    MRSA PCR positive    U/A Nitrite: Negative   Leuk Esterase: Large / RBC: 2-5 /HPF / WBC 10-25 /HPF   / Bacteria: Moderate /HPF    On: meropenem 1000 mg IV Intermittent every 8 hours  vancomycin 1000 mg IV Intermittent every 12 hours    SUGGESTIONs  CHG 4% washes daily and PRN  Height & weight   urine culture  Continue Vanco & meropenem  Monitor Vanco trough & Cr  Repeat blood cultures x2  Repeat white blood cell count
54 y/o male with air leak from 6 shiley trach, responds to positioning  - position appropriately  - Increase air in cuff  - if continuing to leak or pt becomes symptomatic will need to change to size 8 cuffed trach.  - recall as needed  - discussed with Dr. Rose
AHRF/ R LUNG OPACITY/ AIRSPACE DISEASE/ ? ASPIRATION/ QUADREPLEGIA S/P PEG / TRACHE    - REPEAT CXR  - IV ABX  - DTA  - TAPER PRESSORS IF NEEDED IVF  - REPEAT LE DOPPLER  - POOR PROGNOSIS  - DVT / GI PROPHYLAXIS

## 2018-07-04 NOTE — CONSULT NOTE ADULT - ATTENDING COMMENTS
Trach changed from 8 cuffed Shiley to 8 distal XLT shiley, cuffed, with improvement of tidal volumes. Tracheostomy stoma is enlarged, resulting in air leak from standard size Shiley trach.    - Recommend continuing XLT tracheostomy tube  - vent as needed. When not requiring positive pressure ventilation, recommend deflating cuff of tracheostomy tube

## 2018-07-04 NOTE — PROGRESS NOTE ADULT - ASSESSMENT
IMPRESSION  AHRF resolved, off vent  Septic Shock resolved  Right pleural effusion  HCAP  H/O quadraplegia s/p trach and peg    PLAN:    - d/c abx [s/p 9 days]  - trach collar, wean fio2  - humidifed oxygen to loosen secretions, suction as needed  - Feeds asper speech and swallow.   -Check BNP IMPRESSION  AHRF resolved, off vent  Septic Shock resolved  Right pleural effusion  HCAP  H/O quadraplegia s/p trach and peg    PLAN:    - d/c abx [s/p 9 days]  - trach collar, wean fio2  - humidifed oxygen to loosen secretions, suction as needed  - Feeds as per speech and swallow.   - Check BNP and BMP and CBC

## 2018-07-04 NOTE — PROGRESS NOTE ADULT - SUBJECTIVE AND OBJECTIVE BOX
SUBJECTIVE: Patinet examined at bedside. No new complaints.      REVIEW OF SYSTEMS:  See HPI    PHYSICAL EXAM  Vital Signs Last 24 Hrs  T(C): 37.6 (04 Jul 2018 07:47), Max: 37.6 (04 Jul 2018 07:47)  T(F): 99.6 (04 Jul 2018 07:47), Max: 99.6 (04 Jul 2018 07:47)  HR: 112 (04 Jul 2018 07:47) (63 - 112)  BP: 113/66 (04 Jul 2018 07:47) (113/66 - 139/66)  BP(mean): 81 (04 Jul 2018 07:47) (81 - 92)  RR: 20 (04 Jul 2018 07:47) (18 - 20)  SpO2: 100% (03 Jul 2018 23:59) (99% - 100%)    General: AAO x 3  HEENT: NAD          Lymph Nodes: NO lymphadenopathy  Neck:  Supple  Lungs: Decrease breathe sound on right.   Cardiovascular: S1S2  Abdomen: Soft, non tender  Extremities: NO edema or cyanosis  Skin: Intact  neuro: non focal      LABS:                                               MEDICATIONS  (STANDING):  aspirin enteric coated 81 milliGRAM(s) Oral daily  atorvastatin 80 milliGRAM(s) Oral at bedtime  docusate sodium Liquid 100 milliGRAM(s) Oral two times a day  heparin  Injectable 5000 Unit(s) SubCutaneous every 8 hours  melatonin 5 milliGRAM(s) Oral at bedtime  mirtazapine 7.5 milliGRAM(s) Oral at bedtime  pantoprazole   Suspension 40 milliGRAM(s) Oral before breakfast  senna Syrup 5 milliLiter(s) Oral two times a day  sodium chloride 0.9% Bolus 1000 milliLiter(s) IV Bolus once    MEDICATIONS  (PRN):  acetaminophen   Tablet 650 milliGRAM(s) Oral every 6 hours PRN For Temp greater than 38 C (100.4 F) SUBJECTIVE: Patinet examined at bedside. No new complaints.      REVIEW OF SYSTEMS:  See HPI    PHYSICAL EXAM  Vital Signs Last 24 Hrs  T(C): 37.6 (04 Jul 2018 07:47), Max: 37.6 (04 Jul 2018 07:47)  T(F): 99.6 (04 Jul 2018 07:47), Max: 99.6 (04 Jul 2018 07:47)  HR: 112 (04 Jul 2018 07:47) (63 - 112)  BP: 113/66 (04 Jul 2018 07:47) (113/66 - 139/66)  BP(mean): 81 (04 Jul 2018 07:47) (81 - 92)  RR: 20 (04 Jul 2018 07:47) (18 - 20)  SpO2: 100% (03 Jul 2018 23:59) (99% - 100%)    General: AAO x 3  HEENT: Trach          Lymph Nodes: NO lymphadenopathy  Neck:  Supple  Lungs: Decrease breathe sound on right.   Cardiovascular: S1S2  Abdomen: Soft, non tender  Extremities: NO edema or cyanosis  Skin: Intact  neuro: non focal      LABS:                                               MEDICATIONS  (STANDING):  aspirin enteric coated 81 milliGRAM(s) Oral daily  atorvastatin 80 milliGRAM(s) Oral at bedtime  docusate sodium Liquid 100 milliGRAM(s) Oral two times a day  heparin  Injectable 5000 Unit(s) SubCutaneous every 8 hours  melatonin 5 milliGRAM(s) Oral at bedtime  mirtazapine 7.5 milliGRAM(s) Oral at bedtime  pantoprazole   Suspension 40 milliGRAM(s) Oral before breakfast  senna Syrup 5 milliLiter(s) Oral two times a day  sodium chloride 0.9% Bolus 1000 milliLiter(s) IV Bolus once    MEDICATIONS  (PRN):  acetaminophen   Tablet 650 milliGRAM(s) Oral every 6 hours PRN For Temp greater than 38 C (100.4 F)

## 2018-07-05 LAB
ANION GAP SERPL CALC-SCNC: 14 MMOL/L — SIGNIFICANT CHANGE UP (ref 7–14)
BUN SERPL-MCNC: 26 MG/DL — HIGH (ref 10–20)
CALCIUM SERPL-MCNC: 9.7 MG/DL — SIGNIFICANT CHANGE UP (ref 8.5–10.1)
CHLORIDE SERPL-SCNC: 97 MMOL/L — LOW (ref 98–110)
CO2 SERPL-SCNC: 29 MMOL/L — SIGNIFICANT CHANGE UP (ref 17–32)
CREAT SERPL-MCNC: <0.5 MG/DL — LOW (ref 0.7–1.5)
GLUCOSE SERPL-MCNC: 101 MG/DL — HIGH (ref 70–99)
NT-PROBNP SERPL-SCNC: 20 PG/ML — SIGNIFICANT CHANGE UP (ref 0–300)
POTASSIUM SERPL-MCNC: 5.1 MMOL/L — HIGH (ref 3.5–5)
POTASSIUM SERPL-SCNC: 5.1 MMOL/L — HIGH (ref 3.5–5)
SODIUM SERPL-SCNC: 140 MMOL/L — SIGNIFICANT CHANGE UP (ref 135–146)

## 2018-07-05 PROCEDURE — 31575 DIAGNOSTIC LARYNGOSCOPY: CPT

## 2018-07-05 PROCEDURE — 99223 1ST HOSP IP/OBS HIGH 75: CPT | Mod: 25

## 2018-07-05 RX ADMIN — HEPARIN SODIUM 5000 UNIT(S): 5000 INJECTION INTRAVENOUS; SUBCUTANEOUS at 05:10

## 2018-07-05 RX ADMIN — Medication 100 MILLIGRAM(S): at 05:10

## 2018-07-05 RX ADMIN — SENNA PLUS 5 MILLILITER(S): 8.6 TABLET ORAL at 17:11

## 2018-07-05 RX ADMIN — Medication 81 MILLIGRAM(S): at 12:31

## 2018-07-05 RX ADMIN — MIRTAZAPINE 7.5 MILLIGRAM(S): 45 TABLET, ORALLY DISINTEGRATING ORAL at 21:02

## 2018-07-05 RX ADMIN — Medication 5 MILLIGRAM(S): at 21:01

## 2018-07-05 RX ADMIN — HEPARIN SODIUM 5000 UNIT(S): 5000 INJECTION INTRAVENOUS; SUBCUTANEOUS at 14:39

## 2018-07-05 RX ADMIN — HEPARIN SODIUM 5000 UNIT(S): 5000 INJECTION INTRAVENOUS; SUBCUTANEOUS at 21:02

## 2018-07-05 RX ADMIN — PANTOPRAZOLE SODIUM 40 MILLIGRAM(S): 20 TABLET, DELAYED RELEASE ORAL at 06:37

## 2018-07-05 RX ADMIN — ATORVASTATIN CALCIUM 80 MILLIGRAM(S): 80 TABLET, FILM COATED ORAL at 21:01

## 2018-07-05 RX ADMIN — Medication 100 MILLIGRAM(S): at 17:11

## 2018-07-05 RX ADMIN — SENNA PLUS 5 MILLILITER(S): 8.6 TABLET ORAL at 05:10

## 2018-07-05 NOTE — PROGRESS NOTE ADULT - ASSESSMENT
IMPRESSION  AHRF resolved, off vent  Septic Shock resolved  Right pleural effusion  HCAP  H/O quadraplegia s/p trach and peg    PLAN:    - d/c abx [s/p 9 days]  - trach collar, wean fio2  - humidifed oxygen to loosen secretions, suction as needed  - Feeds as per speech and swallow.   - Check BNP and BMP and CBC IMPRESSION  AHRF resolved, off vent  Septic Shock resolved  Right pleural effusion  HCAP  H/O quadraplegia s/p trach and peg    PLAN:    - d/c abx [s/p 9 days]  - trach collar, wean fio2  - humidifed oxygen to loosen secretions, suction as needed  -CUFF LEAK - TRACH CHANGED BY DR. LEMUS FROM 6 TO 8 WITH STILL LOW TIDAL VOLUME , ENT TO F/U

## 2018-07-05 NOTE — PROGRESS NOTE ADULT - SUBJECTIVE AND OBJECTIVE BOX
infectious diseases progress note:  LEXY SANTAMARIA is a 53yMale patient    SEPTIC SHOCK ACUTE RESPIRATORY FAILURE WITH HYPOXIA AND HYPERCAPNIA PNEUMO        ROS:  CONSTITUTIONAL:  Negative fever or chills, feels well, good appetite  EYES:  Negative  blurry vision or double vision  CARDIOVASCULAR:  Negative for chest pain or palpitations  RESPIRATORY:  Negative for cough, wheezing, or SOB   GASTROINTESTINAL:  Negative for nausea, vomiting, diarrhea, constipation, or abdominal pain  GENITOURINARY:  Negative frequency, urgency or dysuria  NEUROLOGIC:  No headache, confusion, dizziness, lightheadedness    Allergies    No Known Drug Allergies  shellfish (Unknown)    Intolerances        ANTIBIOTICS/RELEVANT:  antimicrobials    immunologic:    OTHER:  acetaminophen   Tablet 650 milliGRAM(s) Oral every 6 hours PRN  aspirin enteric coated 81 milliGRAM(s) Oral daily  atorvastatin 80 milliGRAM(s) Oral at bedtime  docusate sodium Liquid 100 milliGRAM(s) Oral two times a day  heparin  Injectable 5000 Unit(s) SubCutaneous every 8 hours  melatonin 5 milliGRAM(s) Oral at bedtime  mirtazapine 7.5 milliGRAM(s) Oral at bedtime  pantoprazole   Suspension 40 milliGRAM(s) Oral before breakfast  senna Syrup 5 milliLiter(s) Oral two times a day  sodium chloride 0.9% Bolus 1000 milliLiter(s) IV Bolus once      Objective:  T(F): 100.2 (07-05-18 @ 07:50), Max: 100.2 (07-05-18 @ 07:50)  HR: 113 (07-05-18 @ 07:50) (57 - 127)  BP: 109/66 (07-05-18 @ 07:50) (98/62 - 124/68)  RR: 20 (07-05-18 @ 07:50) (20 - 23)  SpO2: 98% (07-05-18 @ 07:35) (97% - 99%)    PHYSICAL EXAM  Constitutional:Well-developed, well nourished  Eyes:APOLONIA, EOMI  Ear/Nose/Throat: no oral lesion, no sinus tenderness on percussion	  Neck:no JVD, no lymphadenopathy, supple  Respiratory: CTA niki  Cardiovascular: S1S2 RRR, no murmurs  Gastrointestinal:soft, (+) BS, no HSM  Extremities:no e/e/c    07-05    140  |  97<L>  |  26<H>  ----------------------------<  101<H>  5.1<H>   |  29  |  <0.5<L>

## 2018-07-05 NOTE — PROGRESS NOTE ADULT - SUBJECTIVE AND OBJECTIVE BOX
Patient is a 53y old  Male who presents with a chief complaint of desaturation to 88% and respiratory distress (24 Jun 2018 08:12)    Pneumonia of right lung due to infectious organism, unspecified part of lung  Acute respiratory failure with hypoxia and hypercapnia  SEPTIC SHOCK ACUTE RESPIRATORY FAILURE WITH HYPOXIA AND HYPERCAPNIA PNEUMO [Active]  Deep vein thrombosis (DVT) of popliteal vein of right lower extremity, unspecified chronicity [Active]  PTSD (post-traumatic stress disorder) [Active]  Neurogenic bladder [Active]  Respiratory failure [Active]  Spinal cord injury, cervical region [Active]  Septic shock [Active]  H/O tracheostomy [Active]  PEG (percutaneous endoscopic gastrostomy) status [Active]  Pneumonia of right lung due to infectious organism, unspecified part of lung [Active]  Acute respiratory failure with hypoxia and hypercapnia [Active]          HOSPITAL DAY NO: 12d      Vital Signs Last 24 Hrs  T(C): 37.9 (05 Jul 2018 07:50), Max: 37.9 (05 Jul 2018 07:50)  T(F): 100.2 (05 Jul 2018 07:50), Max: 100.2 (05 Jul 2018 07:50)  HR: 113 (05 Jul 2018 07:50) (57 - 127)  BP: 109/66 (05 Jul 2018 07:50) (98/62 - 124/68)  BP(mean): 82 (05 Jul 2018 07:50) (75 - 91)  RR: 20 (05 Jul 2018 07:50) (20 - 23)  SpO2: 99% (05 Jul 2018 01:25) (96% - 99%)    use of pressors: Y ___  N ___x_    use of sedation: Y ___  N ___x_    Vent dependent: Y_x___  N ____    Mode: AC/ CMV (Assist Control/ Continuous Mandatory Ventilation)  RR (machine): 20  TV (machine): 450  FiO2: 40  PEEP: 5  ITime: 1  MAP: 10  PIP: 23           Mode: AC/ CMV (Assist Control/ Continuous Mandatory Ventilation)  RR (machine): 20  TV (machine): 450  FiO2: 40  PEEP: 5  ITime: 1  MAP: 10  PIP: 23                                 Weaning time:     Significant exam findings:   MS: ax o 3  CHEST: B/L breath sounds   ABDOMEN: soft   HEART:S1/S2 regular  SKIN: intact    No of BMs: 2      Nutrition:                    07-04-18 @ 07:01  -  07-05-18 @ 07:00  --------------------------------------------------------  IN:    Osmolite: 1200 mL  Total IN: 1200 mL          LABS:                                                07-05    140  |  97<L>  |  26<H>  ----------------------------<  101<H>  5.1<H>   |  29  |  <0.5<L>    Ca    9.7      05 Jul 2018 05:20                                                                                                                                            MEDICATIONS  (STANDING):  aspirin enteric coated 81 milliGRAM(s) Oral daily  atorvastatin 80 milliGRAM(s) Oral at bedtime  docusate sodium Liquid 100 milliGRAM(s) Oral two times a day  heparin  Injectable 5000 Unit(s) SubCutaneous every 8 hours  melatonin 5 milliGRAM(s) Oral at bedtime  mirtazapine 7.5 milliGRAM(s) Oral at bedtime  pantoprazole   Suspension 40 milliGRAM(s) Oral before breakfast  senna Syrup 5 milliLiter(s) Oral two times a day  sodium chloride 0.9% Bolus 1000 milliLiter(s) IV Bolus once    MEDICATIONS  (PRN):  acetaminophen   Tablet 650 milliGRAM(s) Oral every 6 hours PRN For Temp greater than 38 C (100.4 F)          Case discussed with staff and family at the bedside

## 2018-07-05 NOTE — CHART NOTE - NSCHARTNOTEFT_GEN_A_CORE
Was called to evaluate patient with 6 shiley trach since 7/27/2017, ENT consulted yesterday and are following patient, ENT attending was made aware by consulting team. Patient will be seen by ENT team today and possible exchange for larger tracheostomy tube. Patient maintaining O2Sat 97% in NAD, -160 with leak. Above was discussed with ENT PA

## 2018-07-05 NOTE — PROGRESS NOTE ADULT - ATTENDING COMMENTS
7/5: surgery for cuff leak 7/5: surgery for cuff leak and trach change 7/5: surgery for cuff leak and trach change BY DR. LEMUS

## 2018-07-06 RX ORDER — ASPIRIN/CALCIUM CARB/MAGNESIUM 324 MG
81 TABLET ORAL DAILY
Qty: 0 | Refills: 0 | Status: DISCONTINUED | OUTPATIENT
Start: 2018-07-06 | End: 2018-07-10

## 2018-07-06 RX ORDER — ASPIRIN/CALCIUM CARB/MAGNESIUM 324 MG
81 TABLET ORAL DAILY
Qty: 0 | Refills: 0 | Status: DISCONTINUED | OUTPATIENT
Start: 2018-07-06 | End: 2018-07-06

## 2018-07-06 RX ADMIN — SENNA PLUS 5 MILLILITER(S): 8.6 TABLET ORAL at 05:10

## 2018-07-06 RX ADMIN — PANTOPRAZOLE SODIUM 40 MILLIGRAM(S): 20 TABLET, DELAYED RELEASE ORAL at 06:03

## 2018-07-06 RX ADMIN — HEPARIN SODIUM 5000 UNIT(S): 5000 INJECTION INTRAVENOUS; SUBCUTANEOUS at 05:10

## 2018-07-06 RX ADMIN — MIRTAZAPINE 7.5 MILLIGRAM(S): 45 TABLET, ORALLY DISINTEGRATING ORAL at 21:01

## 2018-07-06 RX ADMIN — HEPARIN SODIUM 5000 UNIT(S): 5000 INJECTION INTRAVENOUS; SUBCUTANEOUS at 21:00

## 2018-07-06 RX ADMIN — Medication 5 MILLIGRAM(S): at 21:01

## 2018-07-06 RX ADMIN — Medication 100 MILLIGRAM(S): at 17:00

## 2018-07-06 RX ADMIN — ATORVASTATIN CALCIUM 80 MILLIGRAM(S): 80 TABLET, FILM COATED ORAL at 21:01

## 2018-07-06 RX ADMIN — HEPARIN SODIUM 5000 UNIT(S): 5000 INJECTION INTRAVENOUS; SUBCUTANEOUS at 13:33

## 2018-07-06 RX ADMIN — Medication 100 MILLIGRAM(S): at 05:10

## 2018-07-06 RX ADMIN — SENNA PLUS 5 MILLILITER(S): 8.6 TABLET ORAL at 17:00

## 2018-07-06 RX ADMIN — Medication 81 MILLIGRAM(S): at 13:33

## 2018-07-06 NOTE — PROGRESS NOTE ADULT - SUBJECTIVE AND OBJECTIVE BOX
Patient is a 53y old  Male who presents with a chief complaint of desaturation to 88% and respiratory distress (24 Jun 2018 08:12)    SEPTIC SHOCK ACUTE RESPIRATORY FAILURE WITH HYPOXIA AND HYPERCAPNIA PNEUMO  ^UNRESP  No pertinent family history in first degree relatives  MEWS Score  Deep vein thrombosis (DVT) of popliteal vein of right lower extremity, unspecified chronicity  PTSD (post-traumatic stress disorder)  Neurogenic bladder  Respiratory failure  Spinal cord injury, cervical region  Septic shock  H/O tracheostomy  PEG (percutaneous endoscopic gastrostomy) status  UNRESP  Pneumonia of right lung due to infectious organism, unspecified part of lung  Acute respiratory failure with hypoxia and hypercapnia      HOSPITAL DAY NO: 13d      Vital Signs Last 24 Hrs  T(C): 37.6 (06 Jul 2018 07:25), Max: 37.7 (05 Jul 2018 16:05)  T(F): 99.7 (06 Jul 2018 07:25), Max: 99.8 (05 Jul 2018 16:05)  HR: 109 (06 Jul 2018 07:25) (101 - 118)  BP: 97/66 (06 Jul 2018 07:25) (97/66 - 112/61)  BP(mean): 75 (06 Jul 2018 07:25) (75 - 77)  RR: 22 (06 Jul 2018 07:25) (18 - 24)  SpO2: 100% (06 Jul 2018 07:14) (99% - 100%)    use of pressors: Y ___  N ___x_    use of sedation: Y ___  N ___x_    Vent dependent: Y__x__  N ____    Mode: AC/ CMV (Assist Control/ Continuous Mandatory Ventilation)  RR (machine): 20  TV (machine): 450  FiO2: 40  PEEP: 5  ITime: 1  MAP: 12  PIP: 20           Mode: AC/ CMV (Assist Control/ Continuous Mandatory Ventilation)  RR (machine): 20  TV (machine): 450  FiO2: 40  PEEP: 5  ITime: 1  MAP: 12  PIP: 20                                 Weaning time: 7/5/18: none- pt refused    Significant exam findings:   MS: ax o 3  CHEST: B/L breath sounds   ABDOMEN: soft   HEART:S1/S2 regular  SKIN: intact    No of BMs: 0      Nutrition:                    07-05-18 @ 07:01  -  07-06-18 @ 07:00  --------------------------------------------------------  IN:    Osmolite: 480 mL  Total IN: 480 mL          LABS:                                                07-05    140  |  97<L>  |  26<H>  ----------------------------<  101<H>  5.1<H>   |  29  |  <0.5<L>    Ca    9.7      05 Jul 2018 05:20                                                                                                                                            MEDICATIONS  (STANDING):  aspirin enteric coated 81 milliGRAM(s) Oral daily  atorvastatin 80 milliGRAM(s) Oral at bedtime  docusate sodium Liquid 100 milliGRAM(s) Oral two times a day  heparin  Injectable 5000 Unit(s) SubCutaneous every 8 hours  melatonin 5 milliGRAM(s) Oral at bedtime  mirtazapine 7.5 milliGRAM(s) Oral at bedtime  pantoprazole   Suspension 40 milliGRAM(s) Oral before breakfast  senna Syrup 5 milliLiter(s) Oral two times a day  sodium chloride 0.9% Bolus 1000 milliLiter(s) IV Bolus once    MEDICATIONS  (PRN):  acetaminophen   Tablet 650 milliGRAM(s) Oral every 6 hours PRN For Temp greater than 38 C (100.4 F)          Case discussed with staff and family at the bedside

## 2018-07-07 RX ADMIN — ATORVASTATIN CALCIUM 80 MILLIGRAM(S): 80 TABLET, FILM COATED ORAL at 21:03

## 2018-07-07 RX ADMIN — PANTOPRAZOLE SODIUM 40 MILLIGRAM(S): 20 TABLET, DELAYED RELEASE ORAL at 07:29

## 2018-07-07 RX ADMIN — HEPARIN SODIUM 5000 UNIT(S): 5000 INJECTION INTRAVENOUS; SUBCUTANEOUS at 13:33

## 2018-07-07 RX ADMIN — HEPARIN SODIUM 5000 UNIT(S): 5000 INJECTION INTRAVENOUS; SUBCUTANEOUS at 05:01

## 2018-07-07 RX ADMIN — SENNA PLUS 5 MILLILITER(S): 8.6 TABLET ORAL at 05:00

## 2018-07-07 RX ADMIN — SENNA PLUS 5 MILLILITER(S): 8.6 TABLET ORAL at 17:04

## 2018-07-07 RX ADMIN — MIRTAZAPINE 7.5 MILLIGRAM(S): 45 TABLET, ORALLY DISINTEGRATING ORAL at 21:03

## 2018-07-07 RX ADMIN — HEPARIN SODIUM 5000 UNIT(S): 5000 INJECTION INTRAVENOUS; SUBCUTANEOUS at 21:03

## 2018-07-07 RX ADMIN — Medication 5 MILLIGRAM(S): at 21:03

## 2018-07-07 RX ADMIN — Medication 100 MILLIGRAM(S): at 17:04

## 2018-07-07 RX ADMIN — Medication 81 MILLIGRAM(S): at 11:33

## 2018-07-07 RX ADMIN — Medication 100 MILLIGRAM(S): at 05:01

## 2018-07-07 NOTE — CHART NOTE - NSCHARTNOTEFT_GEN_A_CORE
Called by RN that patient only output 100 cc since 11 pm at night. Bladder scan only showed 100 cc. Reviewed prior BMP showed signs of dehydration. Added freewater of 1L per day to tubefeed. Please reassess by Day Team.

## 2018-07-07 NOTE — PROGRESS NOTE ADULT - SUBJECTIVE AND OBJECTIVE BOX
Over Night Events:  Patient seen and examined tolerate TP for 1 hour       ROS:  See HPI    PHYSICAL EXAM    ICU Vital Signs Last 24 Hrs  T(C): 36.9 (07 Jul 2018 07:46), Max: 37.2 (06 Jul 2018 23:00)  T(F): 98.4 (07 Jul 2018 07:46), Max: 99 (06 Jul 2018 23:00)  HR: 95 (07 Jul 2018 07:46) (95 - 122)  BP: 110/55 (07 Jul 2018 07:46) (92/60 - 110/55)  BP(mean): 75 (07 Jul 2018 07:46) (72 - 79)  ABP: --  ABP(mean): --  RR: 22 (07 Jul 2018 07:46) (20 - 22)  SpO2: 75% (07 Jul 2018 07:46) (75% - 99%)      General: Awake   HEENT:     tracheostomy            Lymph Nodes: NO cervical LN   Lungs: Bilateral BS  Cardiovascular: Regular   Abdomen: Soft, Positive BS  Extremities: No clubbing   Skin:   Neurological:     I&O's Detail    06 Jul 2018 07:01  -  07 Jul 2018 07:00  --------------------------------------------------------  IN:    Enteral Tube Flush: 300 mL    Osmolite: 1440 mL  Total IN: 1740 mL    OUT:    Indwelling Catheter - Urethral: 700 mL  Total OUT: 700 mL    Total NET: 1040 mL          LABS:                                                                                                                                                                                                         Mode: AC/ CMV (Assist Control/ Continuous Mandatory Ventilation)  RR (machine): 20  TV (machine): 450  FiO2: 40  PEEP: 5  ITime: 1  MAP: 10  PIP: 19                                          MEDICATIONS  (STANDING):  aspirin  chewable 81 milliGRAM(s) Oral daily  atorvastatin 80 milliGRAM(s) Oral at bedtime  docusate sodium Liquid 100 milliGRAM(s) Oral two times a day  heparin  Injectable 5000 Unit(s) SubCutaneous every 8 hours  melatonin 5 milliGRAM(s) Oral at bedtime  mirtazapine 7.5 milliGRAM(s) Oral at bedtime  pantoprazole   Suspension 40 milliGRAM(s) Oral before breakfast  senna Syrup 5 milliLiter(s) Oral two times a day    MEDICATIONS  (PRN):  acetaminophen   Tablet 650 milliGRAM(s) Oral every 6 hours PRN For Temp greater than 38 C (100.4 F)          Xrays:  TLC:  OG:  ET tube:                                                                                       ECHO:

## 2018-07-08 RX ADMIN — HEPARIN SODIUM 5000 UNIT(S): 5000 INJECTION INTRAVENOUS; SUBCUTANEOUS at 14:09

## 2018-07-08 RX ADMIN — Medication 81 MILLIGRAM(S): at 12:15

## 2018-07-08 RX ADMIN — Medication 100 MILLIGRAM(S): at 05:08

## 2018-07-08 RX ADMIN — HEPARIN SODIUM 5000 UNIT(S): 5000 INJECTION INTRAVENOUS; SUBCUTANEOUS at 05:09

## 2018-07-08 RX ADMIN — Medication 5 MILLIGRAM(S): at 21:06

## 2018-07-08 RX ADMIN — Medication 100 MILLIGRAM(S): at 18:21

## 2018-07-08 RX ADMIN — SENNA PLUS 5 MILLILITER(S): 8.6 TABLET ORAL at 18:21

## 2018-07-08 RX ADMIN — ATORVASTATIN CALCIUM 80 MILLIGRAM(S): 80 TABLET, FILM COATED ORAL at 21:06

## 2018-07-08 RX ADMIN — MIRTAZAPINE 7.5 MILLIGRAM(S): 45 TABLET, ORALLY DISINTEGRATING ORAL at 21:06

## 2018-07-08 RX ADMIN — HEPARIN SODIUM 5000 UNIT(S): 5000 INJECTION INTRAVENOUS; SUBCUTANEOUS at 21:06

## 2018-07-08 RX ADMIN — PANTOPRAZOLE SODIUM 40 MILLIGRAM(S): 20 TABLET, DELAYED RELEASE ORAL at 06:01

## 2018-07-08 RX ADMIN — Medication 650 MILLIGRAM(S): at 23:46

## 2018-07-08 RX ADMIN — SENNA PLUS 5 MILLILITER(S): 8.6 TABLET ORAL at 05:08

## 2018-07-08 NOTE — PROGRESS NOTE ADULT - ASSESSMENT
Impression   ARF on ckd   quadreplegia     plan oral care   frequent suction   trach collar as he tolerated   follow lytes dvt prophylaxis   pending transfer

## 2018-07-08 NOTE — PROGRESS NOTE ADULT - SUBJECTIVE AND OBJECTIVE BOX
Over Night Events:  Patient seen and examined on trach collar for 1 hr tolerating       ROS:  See HPI    PHYSICAL EXAM    ICU Vital Signs Last 24 Hrs  T(C): 38 (08 Jul 2018 07:57), Max: 38 (08 Jul 2018 07:57)  T(F): 100.4 (08 Jul 2018 07:57), Max: 100.4 (08 Jul 2018 07:57)  HR: 110 (08 Jul 2018 07:57) (107 - 117)  BP: 100/62 (08 Jul 2018 07:57) (92/54 - 103/65)  BP(mean): 74 (08 Jul 2018 07:57) (67 - 78)  ABP: --  ABP(mean): --  RR: 20 (08 Jul 2018 07:57) (20 - 24)  SpO2: 100% (08 Jul 2018 07:05) (97% - 100%)      General: Ao.  HEENT:                Lymph Nodes: NO cervical LN   Lungs: Bilateral BS  Cardiovascular: Regular   Abdomen: Soft, Positive BS  Extremities: No clubbing   Skin:   Neurological:  notmoving      I&O's Detail    07 Jul 2018 07:01  -  08 Jul 2018 07:00  --------------------------------------------------------  IN:    Enteral Tube Flush: 990 mL    Osmolite: 960 mL  Total IN: 1950 mL    OUT:    Indwelling Catheter - Urethral: 1400 mL  Total OUT: 1400 mL    Total NET: 550 mL          LABS:                                                                                                                                                                                                         Mode: AC/ CMV (Assist Control/ Continuous Mandatory Ventilation)  RR (machine): 20  TV (machine): 450  FiO2: 40  PEEP: 5  ITime: 1  MAP: 16  PIP: 18                                          MEDICATIONS  (STANDING):  aspirin  chewable 81 milliGRAM(s) Oral daily  atorvastatin 80 milliGRAM(s) Oral at bedtime  docusate sodium Liquid 100 milliGRAM(s) Oral two times a day  heparin  Injectable 5000 Unit(s) SubCutaneous every 8 hours  melatonin 5 milliGRAM(s) Oral at bedtime  mirtazapine 7.5 milliGRAM(s) Oral at bedtime  pantoprazole   Suspension 40 milliGRAM(s) Oral before breakfast  senna Syrup 5 milliLiter(s) Oral two times a day    MEDICATIONS  (PRN):  acetaminophen   Tablet 650 milliGRAM(s) Oral every 6 hours PRN For Temp greater than 38 C (100.4 F)          Xrays:  TLC:  OG:  ET tube:                                                                                       ECHO:

## 2018-07-09 RX ADMIN — Medication 81 MILLIGRAM(S): at 12:29

## 2018-07-09 RX ADMIN — MIRTAZAPINE 7.5 MILLIGRAM(S): 45 TABLET, ORALLY DISINTEGRATING ORAL at 21:00

## 2018-07-09 RX ADMIN — PANTOPRAZOLE SODIUM 40 MILLIGRAM(S): 20 TABLET, DELAYED RELEASE ORAL at 06:02

## 2018-07-09 RX ADMIN — SENNA PLUS 5 MILLILITER(S): 8.6 TABLET ORAL at 18:31

## 2018-07-09 RX ADMIN — Medication 100 MILLIGRAM(S): at 05:26

## 2018-07-09 RX ADMIN — HEPARIN SODIUM 5000 UNIT(S): 5000 INJECTION INTRAVENOUS; SUBCUTANEOUS at 21:01

## 2018-07-09 RX ADMIN — Medication 5 MILLIGRAM(S): at 21:00

## 2018-07-09 RX ADMIN — HEPARIN SODIUM 5000 UNIT(S): 5000 INJECTION INTRAVENOUS; SUBCUTANEOUS at 05:26

## 2018-07-09 RX ADMIN — HEPARIN SODIUM 5000 UNIT(S): 5000 INJECTION INTRAVENOUS; SUBCUTANEOUS at 14:23

## 2018-07-09 RX ADMIN — SENNA PLUS 5 MILLILITER(S): 8.6 TABLET ORAL at 05:26

## 2018-07-09 RX ADMIN — Medication 100 MILLIGRAM(S): at 18:30

## 2018-07-09 RX ADMIN — ATORVASTATIN CALCIUM 80 MILLIGRAM(S): 80 TABLET, FILM COATED ORAL at 21:00

## 2018-07-09 NOTE — PROGRESS NOTE ADULT - ASSESSMENT
Impression:    Acute on chronic respiratory failure  h/o quadraplegia    Plan:    oral care   suction as needed  c/w weaning and trach collar as needed  follow lytes dvt prophylaxis   pending transfer Impression:    Acute on chronic respiratory failure  Aspiration Pneumoinia resolved  h/o quadraplegia    Plan:    oral care   suction as needed  tolerating weaning and trach collar   follow lytes dvt prophylaxis   pending transfer

## 2018-07-09 NOTE — PROGRESS NOTE ADULT - SUBJECTIVE AND OBJECTIVE BOX
LEXY SANTAMARIA  53y  Male    Patient is a 53y old  Male who presents with a chief complaint of desaturation to 88% and respiratory distress (24 Jun 2018 08:12)      SUBJECTIVE/OVERNIGHT EVENTS:    none    PAST MEDICAL & SURGICAL HISTORY:  Deep vein thrombosis (DVT) of popliteal vein of right lower extremity, unspecified chronicity  PTSD (post-traumatic stress disorder)  Neurogenic bladder  Respiratory failure  Spinal cord injury, cervical region  H/O tracheostomy  PEG (percutaneous endoscopic gastrostomy) status    MEDICATIONS  (STANDING):  aspirin  chewable 81 milliGRAM(s) Oral daily  atorvastatin 80 milliGRAM(s) Oral at bedtime  docusate sodium Liquid 100 milliGRAM(s) Oral two times a day  heparin  Injectable 5000 Unit(s) SubCutaneous every 8 hours  melatonin 5 milliGRAM(s) Oral at bedtime  mirtazapine 7.5 milliGRAM(s) Oral at bedtime  pantoprazole   Suspension 40 milliGRAM(s) Oral before breakfast  senna Syrup 5 milliLiter(s) Oral two times a day    MEDICATIONS  (PRN):  acetaminophen   Tablet 650 milliGRAM(s) Oral every 6 hours PRN For Temp greater than 38 C (100.4 F)      OBJECTIVE:    Vital Signs Last 24 Hrs  T(C): 37.3 (09 Jul 2018 04:00), Max: 38.1 (09 Jul 2018 00:03)  T(F): 99.1 (09 Jul 2018 04:00), Max: 100.6 (09 Jul 2018 00:03)  HR: 117 (09 Jul 2018 00:58) (104 - 122)  BP: 113/62 (09 Jul 2018 00:03) (96/54 - 113/62)  BP(mean): 81 (09 Jul 2018 00:03) (66 - 81)  RR: 31 (09 Jul 2018 00:03) (20 - 31)  SpO2: 99% (09 Jul 2018 00:58) (99% - 100%)    General: In NAD  HEENT: + trach               Neck: Supple.   No Cervical LN    Lungs: Bilateral BS  Cardiovascular: Regular   Abdomen: Soft. + BS  Extremities: No CLubbing   Skin:   Neurological: non Focal     I&O's Summary    08 Jul 2018 07:01  -  09 Jul 2018 07:00  --------------------------------------------------------  IN: 2600 mL / OUT: 1200 mL / NET: 1400 mL        BOWEL MOVEMENTS:    PRESSORS:  SEDATION:  VENTED:      LABS:                                             none                                                                                                                                                                           Mode: AC/ CMV (Assist Control/ Continuous Mandatory Ventilation)  RR (machine): 20  TV (machine): 450  FiO2: 40  PEEP: 5  ITime: 1  MAP: 11  PIP: 20 LEXY SANTAMARIA  53y  Male    Patient is a 53y old  Male who presents with a chief complaint of desaturation to 88% and respiratory distress (24 Jun 2018 08:12)      SUBJECTIVE/OVERNIGHT EVENTS:    none    PAST MEDICAL & SURGICAL HISTORY:  Deep vein thrombosis (DVT) of popliteal vein of right lower extremity, unspecified chronicity  PTSD (post-traumatic stress disorder)  Neurogenic bladder  Respiratory failure  Spinal cord injury, cervical region  H/O tracheostomy  PEG (percutaneous endoscopic gastrostomy) status    MEDICATIONS  (STANDING):  aspirin  chewable 81 milliGRAM(s) Oral daily  atorvastatin 80 milliGRAM(s) Oral at bedtime  docusate sodium Liquid 100 milliGRAM(s) Oral two times a day  heparin  Injectable 5000 Unit(s) SubCutaneous every 8 hours  melatonin 5 milliGRAM(s) Oral at bedtime  mirtazapine 7.5 milliGRAM(s) Oral at bedtime  pantoprazole   Suspension 40 milliGRAM(s) Oral before breakfast  senna Syrup 5 milliLiter(s) Oral two times a day    MEDICATIONS  (PRN):  acetaminophen   Tablet 650 milliGRAM(s) Oral every 6 hours PRN For Temp greater than 38 C (100.4 F)      OBJECTIVE:    Vital Signs Last 24 Hrs  T(C): 37.3 (09 Jul 2018 04:00), Max: 38.1 (09 Jul 2018 00:03)  T(F): 99.1 (09 Jul 2018 04:00), Max: 100.6 (09 Jul 2018 00:03)  HR: 117 (09 Jul 2018 00:58) (104 - 122)  BP: 113/62 (09 Jul 2018 00:03) (96/54 - 113/62)  BP(mean): 81 (09 Jul 2018 00:03) (66 - 81)  RR: 31 (09 Jul 2018 00:03) (20 - 31)  SpO2: 99% (09 Jul 2018 00:58) (99% - 100%)    General: In NAD  HEENT: + trach               Neck: Supple.   No Cervical LN    Lungs: Bilateral BS  Cardiovascular: Regular   Abdomen: Soft. + BS, fierro rpesent  Extremities: No CLubbing   Skin: sacral ulcer  Neurological: non Focal     I&O's Summary    08 Jul 2018 07:01  -  09 Jul 2018 07:00  --------------------------------------------------------  IN: 2600 mL / OUT: 1200 mL / NET: 1400 mL        BOWEL MOVEMENTS: last on 7/7/18    PRESSORS: no  SEDATION: no  VENTED: no      LABS:                                             none

## 2018-07-10 ENCOUNTER — TRANSCRIPTION ENCOUNTER (OUTPATIENT)
Age: 53
End: 2018-07-10

## 2018-07-10 VITALS
RESPIRATION RATE: 26 BRPM | DIASTOLIC BLOOD PRESSURE: 63 MMHG | SYSTOLIC BLOOD PRESSURE: 120 MMHG | TEMPERATURE: 99 F | HEART RATE: 115 BPM

## 2018-07-10 RX ORDER — NYSTATIN CREAM 100000 [USP'U]/G
1 CREAM TOPICAL
Qty: 0 | Refills: 0 | Status: DISCONTINUED | OUTPATIENT
Start: 2018-07-10 | End: 2018-07-10

## 2018-07-10 RX ORDER — LACTULOSE 10 G/15ML
0 SOLUTION ORAL
Qty: 0 | Refills: 0 | COMMUNITY

## 2018-07-10 RX ORDER — ATORVASTATIN CALCIUM 80 MG/1
1 TABLET, FILM COATED ORAL
Qty: 0 | Refills: 0 | COMMUNITY
Start: 2018-07-10

## 2018-07-10 RX ORDER — ASPIRIN/CALCIUM CARB/MAGNESIUM 324 MG
1 TABLET ORAL
Qty: 0 | Refills: 0 | COMMUNITY
Start: 2018-07-10

## 2018-07-10 RX ORDER — ROSUVASTATIN CALCIUM 5 MG/1
0 TABLET ORAL
Qty: 0 | Refills: 0 | COMMUNITY

## 2018-07-10 RX ORDER — NYSTATIN CREAM 100000 [USP'U]/G
1 CREAM TOPICAL
Qty: 0 | Refills: 0 | COMMUNITY
Start: 2018-07-10

## 2018-07-10 RX ORDER — SENNA PLUS 8.6 MG/1
0 TABLET ORAL
Qty: 0 | Refills: 0 | COMMUNITY

## 2018-07-10 RX ADMIN — HEPARIN SODIUM 5000 UNIT(S): 5000 INJECTION INTRAVENOUS; SUBCUTANEOUS at 14:40

## 2018-07-10 RX ADMIN — Medication 81 MILLIGRAM(S): at 11:19

## 2018-07-10 RX ADMIN — HEPARIN SODIUM 5000 UNIT(S): 5000 INJECTION INTRAVENOUS; SUBCUTANEOUS at 05:04

## 2018-07-10 RX ADMIN — Medication 100 MILLIGRAM(S): at 05:05

## 2018-07-10 RX ADMIN — PANTOPRAZOLE SODIUM 40 MILLIGRAM(S): 20 TABLET, DELAYED RELEASE ORAL at 07:40

## 2018-07-10 RX ADMIN — SENNA PLUS 5 MILLILITER(S): 8.6 TABLET ORAL at 05:04

## 2018-07-10 NOTE — PROGRESS NOTE ADULT - NSHPATTENDINGPLANDISCUSS_GEN_ALL_CORE
Vent unit staff
VEnt Unit
TEAM
Vent unit staff
team
team
VEnt Unit
Vent unit staff
Vent unit staff

## 2018-07-10 NOTE — PROGRESS NOTE ADULT - PROVIDER SPECIALTY LIST ADULT
Infectious Disease
Pulmonology
Infectious Disease
Pulmonology

## 2018-07-10 NOTE — PROGRESS NOTE ADULT - ASSESSMENT
Impression:    Acute on chronic respiratory failure [not vent dependant]  Aspiration Pneumoinia resolved  h/o quadraplegia    Plan:    oral care   suction as needed  tolerating weaning and trach collar   follow lytes dvt prophylaxis   pending transfer Impression:    Acute on chronic respiratory failure [not vent dependant]  Aspiration Pneumoinia resolved  h/o quadraplegia    Plan:    oral care   suction as needed  tolerating weaning and trach collar, continue weaning  follow hollis dvt prophylaxis   pending transfer to vent facility

## 2018-07-10 NOTE — DISCHARGE NOTE ADULT - MEDICATION SUMMARY - MEDICATIONS TO TAKE
I will START or STAY ON the medications listed below when I get home from the hospital:    Tylenol  -- Indication: For Pain/ fever    aspirin 81 mg oral tablet, chewable  -- 1 tab(s) by mouth once a day  -- Indication: For CAD    Remeron  -- 7.5 milligram(s) by gastrostomy tube once a day  -- Indication: For Sleep regulation    atorvastatin 80 mg oral tablet  -- 1 tab(s) by mouth once a day (at bedtime)  -- Indication: For HLD    nystatin 100,000 units/g topical powder  -- 1 application on skin 2 times a day  -- Indication: For fungal infection    Colace  -- Indication: For Stool softner    melatonin 5 mg oral tablet  -- Indication: For Sleep aid

## 2018-07-10 NOTE — PROGRESS NOTE ADULT - SUBJECTIVE AND OBJECTIVE BOX
LEXY SANTAMARIA  53y  Male    Patient is a 53y old  Male who presents with a chief complaint of desaturation to 88% and respiratory distress (24 Jun 2018 08:12)      SUBJECTIVE/OVERNIGHT EVENTS:      PAST MEDICAL & SURGICAL HISTORY:  Deep vein thrombosis (DVT) of popliteal vein of right lower extremity, unspecified chronicity  PTSD (post-traumatic stress disorder)  Neurogenic bladder  Respiratory failure  Spinal cord injury, cervical region  H/O tracheostomy  PEG (percutaneous endoscopic gastrostomy) status    MEDICATIONS  (STANDING):  aspirin  chewable 81 milliGRAM(s) Oral daily  atorvastatin 80 milliGRAM(s) Oral at bedtime  docusate sodium Liquid 100 milliGRAM(s) Oral two times a day  heparin  Injectable 5000 Unit(s) SubCutaneous every 8 hours  melatonin 5 milliGRAM(s) Oral at bedtime  mirtazapine 7.5 milliGRAM(s) Oral at bedtime  pantoprazole   Suspension 40 milliGRAM(s) Oral before breakfast  senna Syrup 5 milliLiter(s) Oral two times a day    MEDICATIONS  (PRN):  acetaminophen   Tablet 650 milliGRAM(s) Oral every 6 hours PRN For Temp greater than 38 C (100.4 F)      OBJECTIVE:    Vital Signs Last 24 Hrs  T(C): 37.9 (09 Jul 2018 23:32), Max: 37.9 (09 Jul 2018 23:32)  T(F): 100.2 (09 Jul 2018 23:32), Max: 100.2 (09 Jul 2018 23:32)  HR: 120 (09 Jul 2018 23:32) (100 - 120)  BP: 90/50 (09 Jul 2018 23:32) (90/50 - 103/65)  BP(mean): 80 (09 Jul 2018 15:55) (80 - 80)  RR: 23 (09 Jul 2018 23:32) (18 - 23)  SpO2: 100% (09 Jul 2018 23:23) (99% - 100%)    General: In NAD  HEENT: + trach               Neck: Supple.   No Cervical LN    Lungs: Bilateral BS  Cardiovascular: Regular   Abdomen: Soft. + BS  Extremities: No CLubbing   Skin:   Neurological: non Focal     I&O's Summary    09 Jul 2018 07:01  -  10 Jul 2018 07:00  --------------------------------------------------------  IN: 1080 mL / OUT: 900 mL / NET: 180 mL        BOWEL MOVEMENTS:    PRESSORS:  SEDATION:  VENTED:      LABS:                                                                                                                                                                                                                                   Mode: AC/ CMV (Assist Control/ Continuous Mandatory Ventilation)  RR (machine): 20  TV (machine): 450  FiO2: 40  PEEP: 5  ITime: 1  MAP: 10  PIP: 22 LEXY SANTAMARIA  53y  Male    Patient is a 53y old  Male who presents with a chief complaint of desaturation to 88% and respiratory distress (24 Jun 2018 08:12)      SUBJECTIVE/OVERNIGHT EVENTS:    none    PAST MEDICAL & SURGICAL HISTORY:  Deep vein thrombosis (DVT) of popliteal vein of right lower extremity, unspecified chronicity  PTSD (post-traumatic stress disorder)  Neurogenic bladder  Respiratory failure  Spinal cord injury, cervical region  H/O tracheostomy  PEG (percutaneous endoscopic gastrostomy) status    MEDICATIONS  (STANDING):  aspirin  chewable 81 milliGRAM(s) Oral daily  atorvastatin 80 milliGRAM(s) Oral at bedtime  docusate sodium Liquid 100 milliGRAM(s) Oral two times a day  heparin  Injectable 5000 Unit(s) SubCutaneous every 8 hours  melatonin 5 milliGRAM(s) Oral at bedtime  mirtazapine 7.5 milliGRAM(s) Oral at bedtime  pantoprazole   Suspension 40 milliGRAM(s) Oral before breakfast  senna Syrup 5 milliLiter(s) Oral two times a day    MEDICATIONS  (PRN):  acetaminophen   Tablet 650 milliGRAM(s) Oral every 6 hours PRN For Temp greater than 38 C (100.4 F)      OBJECTIVE:    Vital Signs Last 24 Hrs  T(C): 37.9 (09 Jul 2018 23:32), Max: 37.9 (09 Jul 2018 23:32)  T(F): 100.2 (09 Jul 2018 23:32), Max: 100.2 (09 Jul 2018 23:32)  HR: 120 (09 Jul 2018 23:32) (100 - 120)  BP: 90/50 (09 Jul 2018 23:32) (90/50 - 103/65)  BP(mean): 80 (09 Jul 2018 15:55) (80 - 80)  RR: 23 (09 Jul 2018 23:32) (18 - 23)  SpO2: 100% (09 Jul 2018 23:23) (99% - 100%)    General: In NAD  HEENT: + trach               Neck: Supple.   No Cervical LN    Lungs: Bilateral BS  Cardiovascular: Regular   Abdomen: Soft. + BS  Extremities: No CLubbing   Skin: intact  Neurological: non Focal     I&O's Summary    09 Jul 2018 07:01  -  10 Jul 2018 07:00  --------------------------------------------------------  IN: 1080 mL / OUT: 900 mL / NET: 180 mL        BOWEL MOVEMENTS: 1    PRESSORS:no  SEDATION:no  VENTED:yes      LABS:                                                                                                                                                                                                                                   Mode: AC/ CMV (Assist Control/ Continuous Mandatory Ventilation)  RR (machine): 20  TV (machine): 450  FiO2: 40  PEEP: 5  ITime: 1  MAP: 10  PIP: 22

## 2018-07-10 NOTE — DISCHARGE NOTE ADULT - CARE PROVIDER_API CALL
Chandana Morgan), Geriatric Medicine; Internal Medicine; Pulmonary Disease  [address] [place] [address]  Phone: [phone]  Fax: [phone]

## 2018-07-11 DIAGNOSIS — N31.9 NEUROMUSCULAR DYSFUNCTION OF BLADDER, UNSPECIFIED: ICD-10-CM

## 2018-07-11 DIAGNOSIS — A41.9 SEPSIS, UNSPECIFIED ORGANISM: ICD-10-CM

## 2018-07-11 DIAGNOSIS — F43.10 POST-TRAUMATIC STRESS DISORDER, UNSPECIFIED: ICD-10-CM

## 2018-07-11 DIAGNOSIS — Z86.718 PERSONAL HISTORY OF OTHER VENOUS THROMBOSIS AND EMBOLISM: ICD-10-CM

## 2018-07-11 DIAGNOSIS — J95.09 OTHER TRACHEOSTOMY COMPLICATION: ICD-10-CM

## 2018-07-11 DIAGNOSIS — J96.21 ACUTE AND CHRONIC RESPIRATORY FAILURE WITH HYPOXIA: ICD-10-CM

## 2018-07-11 DIAGNOSIS — S14.109S UNSPECIFIED INJURY AT UNSPECIFIED LEVEL OF CERVICAL SPINAL CORD, SEQUELA: ICD-10-CM

## 2018-07-11 DIAGNOSIS — J90 PLEURAL EFFUSION, NOT ELSEWHERE CLASSIFIED: ICD-10-CM

## 2018-07-11 DIAGNOSIS — R65.21 SEVERE SEPSIS WITH SEPTIC SHOCK: ICD-10-CM
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
## 2018-07-11 DIAGNOSIS — J69.0 PNEUMONITIS DUE TO INHALATION OF FOOD AND VOMIT: ICD-10-CM

## 2018-07-11 DIAGNOSIS — E87.2 ACIDOSIS: ICD-10-CM

## 2018-07-11 DIAGNOSIS — R74.8 ABNORMAL LEVELS OF OTHER SERUM ENZYMES: ICD-10-CM

## 2018-07-11 DIAGNOSIS — J96.22 ACUTE AND CHRONIC RESPIRATORY FAILURE WITH HYPERCAPNIA: ICD-10-CM

## 2018-07-11 DIAGNOSIS — J15.6 PNEUMONIA DUE TO OTHER GRAM-NEGATIVE BACTERIA: ICD-10-CM
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
## 2018-07-11 DIAGNOSIS — Z93.1 GASTROSTOMY STATUS: ICD-10-CM

## 2018-07-11 DIAGNOSIS — B95.62 METHICILLIN RESISTANT STAPHYLOCOCCUS AUREUS INFECTION AS THE CAUSE OF DISEASES CLASSIFIED ELSEWHERE: ICD-10-CM

## 2018-07-11 DIAGNOSIS — J93.82 OTHER AIR LEAK: ICD-10-CM

## 2018-07-11 DIAGNOSIS — G82.50 QUADRIPLEGIA, UNSPECIFIED: ICD-10-CM

## 2019-03-07 NOTE — PATIENT PROFILE ADULT. - PMH
Yes Deep vein thrombosis (DVT) of popliteal vein of right lower extremity, unspecified chronicity    Neurogenic bladder    PTSD (post-traumatic stress disorder)    Respiratory failure    Spinal cord injury, cervical region

## 2019-08-23 NOTE — H&P ADULT - NSHPPHYSICALEXAM_GEN_ALL_CORE
Spoke to patient's wife and notified to call [phone] and pick option 1, then option 2 and then option 2. After that she will be able to speak with a representative.  Tanya verbalized understanding.   PHYSICAL EXAM:  GENERAL: NAD  HEAD:  Atraumatic, Normocephalic  EYES: EOMI, PERRLA, conjunctiva and sclera clear  ENMT: No tonsillar erythema, exudates, or enlargement, tracheostomy intact  NECK: Supple, No JVD, Normal thyroid  NERVOUS SYSTEM:  Alert & Oriented X3  CHEST/LUNG: Decreased bilateral breath sounds, bibasilar rales  HEART: Regular rate and rhythm; No murmurs, rubs, or gallops  ABDOMEN: Soft, mild tenderness to epigastrium, Nondistended; Bowel sounds present, PEG intact  EXTREMITIES:  2+ Peripheral Pulses, No clubbing, cyanosis, or edema  LYMPH: No lymphadenopathy noted  SKIN: No rashes or lesions

## 2020-12-04 NOTE — PROGRESS NOTE ADULT - ASSESSMENT
[Excellent] : ~his/her~  mood as  excellent Impression   ARF on ckd   quadreplegia     plan oral care   frequent suction   will try weaning again afternoon   follow lytes dvt prophylaxis   pending transfer [Yes] : Yes [No] : In the past 12 months have you used drugs other than those required for medical reasons? No [One fall no injury in past year] : Patient reported one fall in the past year without injury [0] : 2) Feeling down, depressed, or hopeless: Not at all (0) [Patient reported bone density results were abnormal] : Patient reported bone density results were abnormal [Patient reported colonoscopy was normal] : Patient reported colonoscopy was normal [HIV Test offered] : HIV Test offered [Hepatitis C test offered] : Hepatitis C test offered [None] : None [Alone] : lives alone [Retired] : retired [College] : College [] :  [# Of Children ___] : has [unfilled] children [Feels Safe at Home] : Feels safe at home [Fully functional (bathing, dressing, toileting, transferring, walking, feeding)] : Fully functional (bathing, dressing, toileting, transferring, walking, feeding) [Fully functional (using the telephone, shopping, preparing meals, housekeeping, doing laundry, using] : Fully functional and needs no help or supervision to perform IADLs (using the telephone, shopping, preparing meals, housekeeping, doing laundry, using transportation, managing medications and managing finances) [Smoke Detector] : smoke detector [Carbon Monoxide Detector] : carbon monoxide detector [Safety elements used in home] : safety elements used in home [Seat Belt] :  uses seat belt [Sunscreen] : uses sunscreen [Reviewed no changes] : Reviewed no changes [Name: ___] : Health Care Proxy's Name: [unfilled]  [Relationship: ___] : Relationship: [unfilled] [DNR] : DNR [DNI] : DNI [] : No [de-identified] : right femur fracture [facility] [de-identified] : rarely [de-identified] : fell on cement floor, mechanical fall [[id number]] : 0 [Change in mental status noted] : No change in mental status noted [Language] : denies difficulty with language [Behavior] : denies difficulty with behavior [Learning/Retaining New Information] : denies difficulty learning/retaining new information [Handling Complex Tasks] : denies difficulty handling complex tasks [Reasoning] : denies difficulty with reasoning [Spatial Ability and Orientation] : denies difficulty with spatial ability and orientation [Sexually Active] : not sexually active [High Risk Behavior] : no high risk behavior [Reports changes in hearing] : Reports no changes in hearing [Reports changes in vision] : Reports no changes in vision [Reports changes in dental health] : Reports no changes in dental health [Guns at Home] : no guns at home [BoneDensityDate] : 11/2019 [BoneDensityComments] : osteoporosis [ColonoscopyDate] : 2015 [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

## 2025-03-31 NOTE — SWALLOW FEES ASSESSMENT ADULT - RECOMMENDED FEEDING/EATING TECHNIQUES
alternate food with liquid/maintain upright posture during/after eating for 30 mins/small sips/bites
children